# Patient Record
Sex: MALE | Race: WHITE | Employment: OTHER | URBAN - METROPOLITAN AREA
[De-identification: names, ages, dates, MRNs, and addresses within clinical notes are randomized per-mention and may not be internally consistent; named-entity substitution may affect disease eponyms.]

---

## 2018-07-13 ENCOUNTER — HOSPITAL ENCOUNTER (OUTPATIENT)
Dept: ULTRASOUND IMAGING | Age: 73
Discharge: HOME | End: 2018-07-13
Attending: PHYSICIAN ASSISTANT
Payer: MEDICARE

## 2018-07-13 DIAGNOSIS — R60.0 LOCALIZED EDEMA: ICD-10-CM

## 2018-07-13 PROCEDURE — 93970 EXTREMITY STUDY: CPT

## 2021-05-24 ENCOUNTER — APPOINTMENT (OUTPATIENT)
Dept: GENERAL RADIOLOGY | Age: 76
DRG: 432 | End: 2021-05-24
Attending: FAMILY MEDICINE
Payer: MEDICARE

## 2021-05-24 ENCOUNTER — HOSPITAL ENCOUNTER (INPATIENT)
Age: 76
LOS: 5 days | Discharge: LEFT BEFORE DISCHARGE | DRG: 432 | End: 2021-05-29
Attending: EMERGENCY MEDICINE | Admitting: FAMILY MEDICINE
Payer: MEDICARE

## 2021-05-24 ENCOUNTER — APPOINTMENT (OUTPATIENT)
Dept: VASCULAR SURGERY | Age: 76
DRG: 432 | End: 2021-05-24
Attending: FAMILY MEDICINE
Payer: MEDICARE

## 2021-05-24 ENCOUNTER — APPOINTMENT (OUTPATIENT)
Dept: CT IMAGING | Age: 76
DRG: 432 | End: 2021-05-24
Attending: EMERGENCY MEDICINE
Payer: MEDICARE

## 2021-05-24 DIAGNOSIS — R18.8 CIRRHOSIS OF LIVER WITH ASCITES, UNSPECIFIED HEPATIC CIRRHOSIS TYPE (HCC): Primary | ICD-10-CM

## 2021-05-24 DIAGNOSIS — B18.2 CHRONIC HEPATITIS C WITHOUT HEPATIC COMA (HCC): ICD-10-CM

## 2021-05-24 DIAGNOSIS — R79.89 LFT ELEVATION: ICD-10-CM

## 2021-05-24 DIAGNOSIS — K74.60 CIRRHOSIS OF LIVER WITH ASCITES, UNSPECIFIED HEPATIC CIRRHOSIS TYPE (HCC): Primary | ICD-10-CM

## 2021-05-24 DIAGNOSIS — K62.89 RECTAL MASS: ICD-10-CM

## 2021-05-24 DIAGNOSIS — N50.89 SCROTAL EDEMA: ICD-10-CM

## 2021-05-24 PROBLEM — D69.6 THROMBOCYTOPENIA (HCC): Status: ACTIVE | Noted: 2021-05-24

## 2021-05-24 PROBLEM — R60.0 EDEMA OF BOTH LOWER EXTREMITIES: Status: ACTIVE | Noted: 2021-05-24

## 2021-05-24 PROBLEM — E80.6 HYPERBILIRUBINEMIA: Status: ACTIVE | Noted: 2021-05-24

## 2021-05-24 PROBLEM — R53.1 GENERALIZED WEAKNESS: Status: ACTIVE | Noted: 2021-05-24

## 2021-05-24 PROBLEM — D64.9 ANEMIA: Status: ACTIVE | Noted: 2021-05-24

## 2021-05-24 LAB
ALBUMIN SERPL-MCNC: 2.4 G/DL (ref 3.5–5)
ALBUMIN/GLOB SERPL: 0.6 {RATIO} (ref 1.1–2.2)
ALP SERPL-CCNC: 145 U/L (ref 45–117)
ALT SERPL-CCNC: 49 U/L (ref 12–78)
ANION GAP SERPL CALC-SCNC: 4 MMOL/L (ref 5–15)
APPEARANCE UR: CLEAR
AST SERPL-CCNC: 80 U/L (ref 15–37)
ATRIAL RATE: 66 BPM
BASOPHILS # BLD: 0.1 K/UL (ref 0–0.1)
BASOPHILS NFR BLD: 1 % (ref 0–1)
BILIRUB SERPL-MCNC: 1.3 MG/DL (ref 0.2–1)
BILIRUB UR QL: NEGATIVE
BNP SERPL-MCNC: 744 PG/ML
BUN SERPL-MCNC: 7 MG/DL (ref 6–20)
BUN/CREAT SERPL: 10 (ref 12–20)
CALCIUM SERPL-MCNC: 8.2 MG/DL (ref 8.5–10.1)
CALCULATED P AXIS, ECG09: 80 DEGREES
CALCULATED R AXIS, ECG10: 38 DEGREES
CALCULATED T AXIS, ECG11: 53 DEGREES
CHLORIDE SERPL-SCNC: 110 MMOL/L (ref 97–108)
CO2 SERPL-SCNC: 26 MMOL/L (ref 21–32)
COLOR UR: NORMAL
COMMENT, HOLDF: NORMAL
CREAT SERPL-MCNC: 0.73 MG/DL (ref 0.7–1.3)
DIAGNOSIS, 93000: NORMAL
DIFFERENTIAL METHOD BLD: ABNORMAL
EOSINOPHIL # BLD: 0.3 K/UL (ref 0–0.4)
EOSINOPHIL NFR BLD: 5 % (ref 0–7)
ERYTHROCYTE [DISTWIDTH] IN BLOOD BY AUTOMATED COUNT: 12.6 % (ref 11.5–14.5)
GLOBULIN SER CALC-MCNC: 3.8 G/DL (ref 2–4)
GLUCOSE SERPL-MCNC: 99 MG/DL (ref 65–100)
GLUCOSE UR STRIP.AUTO-MCNC: NEGATIVE MG/DL
HAV IGM SER QL: NONREACTIVE
HBV CORE IGM SER QL: NONREACTIVE
HBV SURFACE AG SER QL: <0.1 INDEX
HBV SURFACE AG SER QL: NEGATIVE
HCT VFR BLD AUTO: 34.5 % (ref 36.6–50.3)
HCV AB SER IA-ACNC: >11 INDEX
HCV AB SERPL QL IA: REACTIVE
HCV COMMENT,HCGAC: ABNORMAL
HGB BLD-MCNC: 11.6 G/DL (ref 12.1–17)
HGB UR QL STRIP: NEGATIVE
IMM GRANULOCYTES # BLD AUTO: 0 K/UL (ref 0–0.04)
IMM GRANULOCYTES NFR BLD AUTO: 0 % (ref 0–0.5)
KETONES UR QL STRIP.AUTO: NEGATIVE MG/DL
LEUKOCYTE ESTERASE UR QL STRIP.AUTO: NEGATIVE
LIPASE SERPL-CCNC: 168 U/L (ref 73–393)
LYMPHOCYTES # BLD: 1.2 K/UL (ref 0.8–3.5)
LYMPHOCYTES NFR BLD: 24 % (ref 12–49)
MCH RBC QN AUTO: 34 PG (ref 26–34)
MCHC RBC AUTO-ENTMCNC: 33.6 G/DL (ref 30–36.5)
MCV RBC AUTO: 101.2 FL (ref 80–99)
MONOCYTES # BLD: 0.6 K/UL (ref 0–1)
MONOCYTES NFR BLD: 12 % (ref 5–13)
NEUTS SEG # BLD: 2.7 K/UL (ref 1.8–8)
NEUTS SEG NFR BLD: 58 % (ref 32–75)
NITRITE UR QL STRIP.AUTO: NEGATIVE
NRBC # BLD: 0 K/UL (ref 0–0.01)
NRBC BLD-RTO: 0 PER 100 WBC
P-R INTERVAL, ECG05: 158 MS
PH UR STRIP: 7.5 [PH] (ref 5–8)
PLATELET # BLD AUTO: 130 K/UL (ref 150–400)
PMV BLD AUTO: 10.2 FL (ref 8.9–12.9)
POTASSIUM SERPL-SCNC: 3.5 MMOL/L (ref 3.5–5.1)
PROT SERPL-MCNC: 6.2 G/DL (ref 6.4–8.2)
PROT UR STRIP-MCNC: NEGATIVE MG/DL
Q-T INTERVAL, ECG07: 426 MS
QRS DURATION, ECG06: 82 MS
QTC CALCULATION (BEZET), ECG08: 446 MS
RBC # BLD AUTO: 3.41 M/UL (ref 4.1–5.7)
SAMPLES BEING HELD,HOLD: NORMAL
SODIUM SERPL-SCNC: 140 MMOL/L (ref 136–145)
SP GR UR REFRACTOMETRY: 1.01 (ref 1–1.03)
SP1: ABNORMAL
SP2: ABNORMAL
SP3: ABNORMAL
TROPONIN I SERPL-MCNC: <0.05 NG/ML
UROBILINOGEN UR QL STRIP.AUTO: 1 EU/DL (ref 0.2–1)
VENTRICULAR RATE, ECG03: 66 BPM
WBC # BLD AUTO: 4.8 K/UL (ref 4.1–11.1)

## 2021-05-24 PROCEDURE — 99285 EMERGENCY DEPT VISIT HI MDM: CPT

## 2021-05-24 PROCEDURE — 83690 ASSAY OF LIPASE: CPT

## 2021-05-24 PROCEDURE — 93970 EXTREMITY STUDY: CPT

## 2021-05-24 PROCEDURE — 65270000032 HC RM SEMIPRIVATE

## 2021-05-24 PROCEDURE — 83880 ASSAY OF NATRIURETIC PEPTIDE: CPT

## 2021-05-24 PROCEDURE — 84484 ASSAY OF TROPONIN QUANT: CPT

## 2021-05-24 PROCEDURE — 81003 URINALYSIS AUTO W/O SCOPE: CPT

## 2021-05-24 PROCEDURE — 74177 CT ABD & PELVIS W/CONTRAST: CPT

## 2021-05-24 PROCEDURE — 85025 COMPLETE CBC W/AUTO DIFF WBC: CPT

## 2021-05-24 PROCEDURE — 80074 ACUTE HEPATITIS PANEL: CPT

## 2021-05-24 PROCEDURE — 74011000636 HC RX REV CODE- 636: Performed by: RADIOLOGY

## 2021-05-24 PROCEDURE — 93005 ELECTROCARDIOGRAM TRACING: CPT

## 2021-05-24 PROCEDURE — 36415 COLL VENOUS BLD VENIPUNCTURE: CPT

## 2021-05-24 PROCEDURE — 80053 COMPREHEN METABOLIC PANEL: CPT

## 2021-05-24 PROCEDURE — 94762 N-INVAS EAR/PLS OXIMTRY CONT: CPT

## 2021-05-24 RX ORDER — POLYETHYLENE GLYCOL 3350 17 G/17G
17 POWDER, FOR SOLUTION ORAL DAILY PRN
Status: DISCONTINUED | OUTPATIENT
Start: 2021-05-24 | End: 2021-05-28

## 2021-05-24 RX ORDER — ONDANSETRON 2 MG/ML
4 INJECTION INTRAMUSCULAR; INTRAVENOUS
Status: DISCONTINUED | OUTPATIENT
Start: 2021-05-24 | End: 2021-05-29 | Stop reason: HOSPADM

## 2021-05-24 RX ORDER — SODIUM CHLORIDE 0.9 % (FLUSH) 0.9 %
5-40 SYRINGE (ML) INJECTION EVERY 8 HOURS
Status: DISCONTINUED | OUTPATIENT
Start: 2021-05-24 | End: 2021-05-29 | Stop reason: HOSPADM

## 2021-05-24 RX ORDER — SODIUM CHLORIDE 0.9 % (FLUSH) 0.9 %
5-40 SYRINGE (ML) INJECTION AS NEEDED
Status: DISCONTINUED | OUTPATIENT
Start: 2021-05-24 | End: 2021-05-29 | Stop reason: HOSPADM

## 2021-05-24 RX ADMIN — Medication 10 ML: at 21:45

## 2021-05-24 RX ADMIN — IOPAMIDOL 100 ML: 755 INJECTION, SOLUTION INTRAVENOUS at 16:01

## 2021-05-24 NOTE — ROUTINE PROCESS
TRANSFER - OUT REPORT: 
 
Verbal report given to RN Karma(name) on Mindy Maravilla  being transferred to (unit) for routine progression of care Report consisted of patients Situation, Background, Assessment and  
Recommendations(SBAR). Information from the following report(s) SBAR, Kardex and ED Summary was reviewed with the receiving nurse. Lines:  
Peripheral IV 05/24/21 Left Antecubital (Active) Site Assessment Clean, dry, & intact 05/24/21 1258 Phlebitis Assessment 0 05/24/21 1258 Infiltration Assessment 0 05/24/21 1258 Dressing Status Clean, dry, & intact 05/24/21 1258 Dressing Type Transparent 05/24/21 1258 Hub Color/Line Status Patent; Flushed;Capped;Pink 05/24/21 1258 Action Taken Blood drawn 05/24/21 1258 Opportunity for questions and clarification was provided. Patient transported with: 
 [employer]

## 2021-05-24 NOTE — PROGRESS NOTES
TRANSFER - IN REPORT:    Verbal report received from [address] RN(name) on Nancy Mayer  being received from ED(unit) for routine progression of care      Report consisted of patients Situation, Background, Assessment and   Recommendations(SBAR). Information from the following report(s) SBAR, ED Summary, MAR, Recent Results and Med Rec Status was reviewed with the receiving nurse. Opportunity for questions and clarification was provided. Assessment completed upon patients arrival to unit and care assumed.

## 2021-05-24 NOTE — ED TRIAGE NOTES
Pt c/o bilateral leg swelling x years, getting worse, pt also having right and left sided abd pain, denies cp, some sob, denies cough, denies fever or chills , denies n/v

## 2021-05-24 NOTE — H&P
History & Physical    Date of admission: 5/24/2021    Patient name: Huyen Echeverria  MRN: [id number]  YOB: 1945  Age: 76 y.o. Primary care provider:  None     Source of Information: patient, patient's friend/ power of , ED and electronic medical records                                  Chief complaint:  Leg swelling and abdominal pain    History of present illness  Huyen Echeverria is a 76 y.o. male with past medical history of alcohol abuse and remote IV drug abuse presented to the ED from home with chief complaints of leg swelling and abdominal pain. Patient is a limited historian, very anxious and intermittently argumentative during exam.  His friend and power of  who was at the bedside provided some history. Remainder of history was obtained per review of ED and electronic medical records. Per these reports, patient has chronic bilateral leg swelling (since \"2016\") but recently he has generalized swelling not only involving legs but also his scrotum with abdominal distention and pain. Patient would not rate or further describe his abdominal pain. On arrival in the ED, per ED exam patient had 4+ pitting edema of leg and scrotal edema. Workup included CT abdomen/ pelvis showing a 3.6 cm annular mass in the mid rectum, cirrhosis with portal hypertension, extensive varices including large, and distended gallbladder with no stones or ductal dilatation. Acute hepatitis panel showed Hep C antibody reactive. Patient notes that he thinks that he may have had Hep C in the past with prior remote history of IV drug abuse. Patient is now seen for admission to the hospitalist service.   There were no reports of new onset syncope, loss of consciousness, headache, neck pain, visual disturbance, numbness, paresthesias, focal weakness, chest pain, palpitations, SOB, nausea, vomiting, diarrhea, calf pain, increased leg swelling/ edema, fever, chills, rash, or known COVID 19 infection. PAST MEDICAL HISTORY:  Chronic leg edema  Alcohol abuse    MEDICATIONS:  NONE    ALLERGIES:  NO KNOWN DRUG ALLERGIES    FAMILY HISTORY:  Unknown regarding family members with chronic medical conditions     Social history  Patient resides  x  Independently                Ambulates      x  With cane              Alcohol history   x  former history of alcohol abuse           Smoking history          x  Current smoker         Illicit drugs:  Former h/o IV drug abuse in 1980's    Code status      x  DNR/DNI        Code status was discussed with the patient and his power of . Review of systems  Pertinent positives as noted in HPI. All other systems were reviewed and were negative. Physical Examination   Visit Vitals  BP (!) 150/70   Pulse (!) 54   Temp 97.4 °F (36.3 °C)   Resp 20   SpO2 97%          O2 Device: None (Room air)    General:  Patient in no acute respiratory distress. Head:  Normocephalic, without obvious abnormality, atraumatic   Eyes:  Conjunctivae/corneas clear. PERRL, EOMs intact   E/N/M/T: Nares normal. Septum midline.  No nasal drainage or sinus tenderness  Lips, mucosa, and tongue normal   Teeth and gums normal  Clear oropharynx   Neck: Normal appearance and movements, symmetrical, trachea midline  No palpable adenopathy  No thyroid enlargement, tenderness or nodules  No carotid bruit   No JVD   Lungs:   Symmetrical chest expansion and respiratory effort  Clear to auscultation bilaterally   Chest wall:  No tenderness or deformity   Heart:  Regular rate and rhythm   Sounds normal; no murmur, click, rub or gallop   Abdomen:   Soft, no tenderness  Bowel sounds normal  No masses or hepatosplenomegaly  No hernias present   Back: No CVA tenderness   Extremities: Extremities normal, atraumatic  No cyanosis  4+ pitting edema BLE  No DVT signs   Pulses 2+ radial/ 1+ DP bilateral pulses   Skin: No rashes or ulcers   Musculo-      skeletal: Gait not tested     Neuro: GCS 15. Moves all extremities x 4. No slurred speech. No facial droop. Sensation grossly intact. Psych: Alert, oriented x3     Genitourinary:    Scrotal edema     Data Review      24 Hour Results:  Recent Results (from the past 24 hour(s))   EKG, 12 LEAD, INITIAL    Collection Time: 05/24/21 12:44 PM   Result Value Ref Range    Ventricular Rate 66 BPM    Atrial Rate 66 BPM    P-R Interval 158 ms    QRS Duration 82 ms    Q-T Interval 426 ms    QTC Calculation (Bezet) 446 ms    Calculated P Axis 80 degrees    Calculated R Axis 38 degrees    Calculated T Axis 53 degrees    Diagnosis       Normal sinus rhythm  No previous ECGs available  Confirmed by Abdirahman Gray M.D., Evins Solum ([id number]) on 5/24/2021 3:54:59 PM     CBC WITH AUTOMATED DIFF    Collection Time: 05/24/21 12:46 PM   Result Value Ref Range    WBC 4.8 4.1 - 11.1 K/uL    RBC 3.41 (L) 4.10 - 5.70 M/uL    HGB 11.6 (L) 12.1 - 17.0 g/dL    HCT 34.5 (L) 36.6 - 50.3 %    .2 (H) 80.0 - 99.0 FL    MCH 34.0 26.0 - 34.0 PG    MCHC 33.6 30.0 - 36.5 g/dL    RDW 12.6 11.5 - 14.5 %    PLATELET 666 (L) 823 - 400 K/uL    MPV 10.2 8.9 - 12.9 FL    NRBC 0.0 0  WBC    ABSOLUTE NRBC 0.00 0.00 - 0.01 K/uL    NEUTROPHILS 58 32 - 75 %    LYMPHOCYTES 24 12 - 49 %    MONOCYTES 12 5 - 13 %    EOSINOPHILS 5 0 - 7 %    BASOPHILS 1 0 - 1 %    IMMATURE GRANULOCYTES 0 0.0 - 0.5 %    ABS. NEUTROPHILS 2.7 1.8 - 8.0 K/UL    ABS. LYMPHOCYTES 1.2 0.8 - 3.5 K/UL    ABS. MONOCYTES 0.6 0.0 - 1.0 K/UL    ABS. EOSINOPHILS 0.3 0.0 - 0.4 K/UL    ABS. BASOPHILS 0.1 0.0 - 0.1 K/UL    ABS. IMM.  GRANS. 0.0 0.00 - 0.04 K/UL    DF AUTOMATED     METABOLIC PANEL, COMPREHENSIVE    Collection Time: 05/24/21 12:46 PM   Result Value Ref Range    Sodium 140 136 - 145 mmol/L    Potassium 3.5 3.5 - 5.1 mmol/L    Chloride 110 (H) 97 - 108 mmol/L    CO2 26 21 - 32 mmol/L    Anion gap 4 (L) 5 - 15 mmol/L    Glucose 99 65 - 100 mg/dL    BUN 7 6 - 20 MG/DL    Creatinine 0.73 0.70 - 1.30 MG/DL    BUN/Creatinine ratio 10 (L) 12 - 20      GFR est AA >60 >60 ml/min/1.73m2    GFR est non-AA >60 >60 ml/min/1.73m2    Calcium 8.2 (L) 8.5 - 10.1 MG/DL    Bilirubin, total 1.3 (H) 0.2 - 1.0 MG/DL    ALT (SGPT) 49 12 - 78 U/L    AST (SGOT) 80 (H) 15 - 37 U/L    Alk. phosphatase 145 (H) 45 - 117 U/L    Protein, total 6.2 (L) 6.4 - 8.2 g/dL    Albumin 2.4 (L) 3.5 - 5.0 g/dL    Globulin 3.8 2.0 - 4.0 g/dL    A-G Ratio 0.6 (L) 1.1 - 2.2     SAMPLES BEING HELD    Collection Time: 05/24/21 12:46 PM   Result Value Ref Range    SAMPLES BEING HELD 1blu,1red     COMMENT        Add-on orders for these samples will be processed based on acceptable specimen integrity and analyte stability, which may vary by analyte. NT-PRO BNP    Collection Time: 05/24/21 12:46 PM   Result Value Ref Range    NT pro- (H) <450 PG/ML   TROPONIN I    Collection Time: 05/24/21 12:46 PM   Result Value Ref Range    Troponin-I, Qt. <0.05 <0.05 ng/mL   LIPASE    Collection Time: 05/24/21 12:46 PM   Result Value Ref Range    Lipase 168 73 - 393 U/L   HEPATITIS PANEL, ACUTE    Collection Time: 05/24/21 12:46 PM   Result Value Ref Range    Hepatitis A, IgM NONREACTIVE NR      __          Hepatitis B surface Ag <0.10 Index    Hep B surface Ag Interp. Negative NEG      __          Hepatitis B core, IgM NONREACTIVE NR      __          Hepatitis C virus Ab >11.00 Index    Hep C virus Ab Interp.  REACTIVE (A) NR      Hep C  virus Ab comment Method used is Siemens Advia Centaur     URINALYSIS W/ RFLX MICROSCOPIC    Collection Time: 05/24/21  6:09 PM   Result Value Ref Range    Color YELLOW/STRAW      Appearance CLEAR CLEAR      Specific gravity 1.010 1.003 - 1.030      pH (UA) 7.5 5.0 - 8.0      Protein Negative NEG mg/dL    Glucose Negative NEG mg/dL    Ketone Negative NEG mg/dL    Bilirubin Negative NEG      Blood Negative NEG      Urobilinogen 1.0 0.2 - 1.0 EU/dL    Nitrites Negative NEG      Leukocyte Esterase Negative NEG       Recent Labs     05/24/21  1246   WBC 4.8   HGB 11.6*   HCT 34.5*   *     Recent Labs     05/24/21  1246      K 3.5   *   CO2 26   GLU 99   BUN 7   CREA 0.73   CA 8.2*   ALB 2.4*   TBILI 1.3*   ALT 49       Imaging  CT ABD PELV W CONT     INDICATION: abdominal swelling     COMPARISON: None      CONTRAST: 100 mL of Isovue-370.     TECHNIQUE:   Following the uneventful intravenous administration of contrast, thin axial  images were obtained through the abdomen and pelvis. Coronal and sagittal  reconstructions were generated. Oral contrast was not administered. CT dose  reduction was achieved through use of a standardized protocol tailored for this  examination and automatic exposure control for dose modulation.     FINDINGS:   LOWER THORAX: No significant abnormality in the incidentally imaged lower chest.  LIVER: Small and nodular. No enhancing mass lesion  BILIARY TREE: Distended but no gallstones or surrounding inflammation CBD is not  dilated. SPLEEN: Measures 13.7 cm  PANCREAS: No mass or ductal dilatation. ADRENALS: Unremarkable. KIDNEYS: Probable renal cysts. No hydronephrosis  STOMACH: Unremarkable. SMALL BOWEL: No dilatation or wall thickening. COLON: Scattered diverticulosis. Mild thickening of the colonic wall may be  related to the patient's cirrhosis. . 3.6 cm annular mass in the mid rectum. There is enhancement of the fat posterior lateral to the mass concerning for  transmural spread. APPENDIX: Normal  PERITONEUM: No ascites or pneumoperitoneum. RETROPERITONEUM: No lymphadenopathy or aortic aneurysm. Extensive vascular  calcifications. There are extensive varices in the upper abdomen with large  gastric varices  REPRODUCTIVE ORGANS: Not enlarged  URINARY BLADDER: Small stones within the bladder. BONES: No destructive bone lesion. ABDOMINAL WALL: No mass or hernia. ADDITIONAL COMMENTS: N/A     IMPRESSION     1. 3.6 cm annular mass in the mid rectum. There appears to be transmural spread  posterior laterally on the left  2. Cirrhosis with portal hypertension and extensive varices including large  gastric varices  3. Gallbladder is distended but no stones or ductal dilatation. Assessment and Plan   1. Cirrhosis of liver with ascites       - with portal hypertension and gastric varices       - admit to telemetry       - consult hepatologist        - repeat LFTs        2. Rectal mass       - consult colorectal surgeon in a.m. 3.  Hyperbilirubinemia       - repeat total and direct bilirubin level in a.m.    4.  Anemia        - order repeat H/H       - check iron profile, B12, and folate levels    5. Thrombocytopenia         - repeat platelet count    6. Generalized weakness        - required assistance to stand; place on fall precautions       - consider for PT evaluation    7. Scrotal edema        - may use scrotal elevator    8. LFT elevation       - repeat CMP    9. Edema of both lower extremities        - former used diuretics but patient stopped due to \"diarrhea\"       - keep legs elevated at rest       - monitor fluid status with strict Is and Os / daily weights       - ordered venous duplex BLE to rule out DVT    10.  Generalized abdominal pain         - no acute GI tenderness elicited on exam         - continue plan as above    11. Tobacco abuse         - encourage smoking cessation         - Nicotine patch    VTE prophylaxis:  SCDs to [address] [place] [address] Avenue:  DO NOT RESUSCITATE (DNR) as discussed with both patient and his power of . Patient signed durable DNR form.        Signed by: Zandra Delgado MD    May 24, 2021 at 7:47 PM

## 2021-05-25 ENCOUNTER — APPOINTMENT (OUTPATIENT)
Dept: NON INVASIVE DIAGNOSTICS | Age: 76
DRG: 432 | End: 2021-05-25
Attending: FAMILY MEDICINE
Payer: MEDICARE

## 2021-05-25 ENCOUNTER — APPOINTMENT (OUTPATIENT)
Dept: GENERAL RADIOLOGY | Age: 76
DRG: 432 | End: 2021-05-25
Attending: INTERNAL MEDICINE
Payer: MEDICARE

## 2021-05-25 ENCOUNTER — APPOINTMENT (OUTPATIENT)
Dept: GENERAL RADIOLOGY | Age: 76
DRG: 432 | End: 2021-05-25
Attending: FAMILY MEDICINE
Payer: MEDICARE

## 2021-05-25 LAB
ALBUMIN SERPL-MCNC: 2 G/DL (ref 3.5–5)
ALBUMIN/GLOB SERPL: 0.6 {RATIO} (ref 1.1–2.2)
ALP SERPL-CCNC: 104 U/L (ref 45–117)
ALT SERPL-CCNC: 42 U/L (ref 12–78)
AMMONIA PLAS-SCNC: 82 UMOL/L
ANION GAP SERPL CALC-SCNC: 6 MMOL/L (ref 5–15)
APTT PPP: 32.5 SEC (ref 22.1–31)
AST SERPL-CCNC: 71 U/L (ref 15–37)
BASOPHILS # BLD: 0.1 K/UL (ref 0–0.1)
BASOPHILS NFR BLD: 1 % (ref 0–1)
BILIRUB SERPL-MCNC: 1.4 MG/DL (ref 0.2–1)
BUN SERPL-MCNC: 7 MG/DL (ref 6–20)
BUN/CREAT SERPL: 12 (ref 12–20)
CALCIUM SERPL-MCNC: 8 MG/DL (ref 8.5–10.1)
CHLORIDE SERPL-SCNC: 110 MMOL/L (ref 97–108)
CO2 SERPL-SCNC: 22 MMOL/L (ref 21–32)
CREAT SERPL-MCNC: 0.6 MG/DL (ref 0.7–1.3)
DIFFERENTIAL METHOD BLD: ABNORMAL
ECHO AO ROOT DIAM: 3.25 CM
ECHO AV AREA PEAK VELOCITY: 2.91 CM2
ECHO AV AREA/BSA PEAK VELOCITY: 1.4 CM2/M2
ECHO AV PEAK GRADIENT: 8.19 MMHG
ECHO AV PEAK VELOCITY: 143.06 CM/S
ECHO IVC PROX: 2.44 CM
ECHO LA MAJOR AXIS: 4.11 CM
ECHO LA MINOR AXIS: 2.02 CM
ECHO LV E' LATERAL VELOCITY: 10.46 CM/S
ECHO LV E' SEPTAL VELOCITY: 9.87 CM/S
ECHO LV EDV A2C: 103.74 ML
ECHO LV EDV A4C: 80.01 ML
ECHO LV EDV BP: 93.16 ML (ref 67–155)
ECHO LV EDV INDEX A4C: 39.4 ML/M2
ECHO LV EDV INDEX BP: 45.9 ML/M2
ECHO LV EDV NDEX A2C: 51.1 ML/M2
ECHO LV EJECTION FRACTION A2C: 77 PERCENT
ECHO LV EJECTION FRACTION A4C: 70 PERCENT
ECHO LV EJECTION FRACTION BIPLANE: 74.1 PERCENT (ref 55–100)
ECHO LV ESV A2C: 23.52 ML
ECHO LV ESV A4C: 24.02 ML
ECHO LV ESV BP: 24.13 ML (ref 22–58)
ECHO LV ESV INDEX A2C: 11.6 ML/M2
ECHO LV ESV INDEX A4C: 11.8 ML/M2
ECHO LV ESV INDEX BP: 11.9 ML/M2
ECHO LV INTERNAL DIMENSION DIASTOLIC: 4.79 CM (ref 4.2–5.9)
ECHO LV INTERNAL DIMENSION SYSTOLIC: 3.1 CM
ECHO LV IVSD: 0.91 CM (ref 0.6–1)
ECHO LV MASS 2D: 148.4 G (ref 88–224)
ECHO LV MASS INDEX 2D: 73.1 G/M2 (ref 49–115)
ECHO LV POSTERIOR WALL DIASTOLIC: 0.9 CM (ref 0.6–1)
ECHO LVOT DIAM: 2.32 CM
ECHO LVOT PEAK GRADIENT: 3.88 MMHG
ECHO LVOT PEAK VELOCITY: 98.48 CM/S
ECHO MV A VELOCITY: 57.86 CM/S
ECHO MV AREA PHT: 2.2 CM2
ECHO MV E DECELERATION TIME (DT): 344.91 MS
ECHO MV E VELOCITY: 82.48 CM/S
ECHO MV E/A RATIO: 1.43
ECHO MV E/E' LATERAL: 7.89
ECHO MV E/E' RATIO (AVERAGED): 8.12
ECHO MV E/E' SEPTAL: 8.36
ECHO MV PRESSURE HALF TIME (PHT): 100.02 MS
ECHO PV PEAK INSTANTANEOUS GRADIENT SYSTOLIC: 2.2 MMHG
ECHO PV REGURGITANT MAX VELOCITY: 74.23 CM/S
ECHO RV INTERNAL DIMENSION: 4.01 CM
ECHO RV TAPSE: 3.52 CM (ref 1.5–2)
ECHO TV REGURGITANT MAX VELOCITY: 289.08 CM/S
ECHO TV REGURGITANT PEAK GRADIENT: 33.43 MMHG
EOSINOPHIL # BLD: 0.4 K/UL (ref 0–0.4)
EOSINOPHIL NFR BLD: 7 % (ref 0–7)
ERYTHROCYTE [DISTWIDTH] IN BLOOD BY AUTOMATED COUNT: 12.2 % (ref 11.5–14.5)
FERRITIN SERPL-MCNC: 656 NG/ML (ref 26–388)
FOLATE SERPL-MCNC: 7.6 NG/ML (ref 5–21)
GLOBULIN SER CALC-MCNC: 3.4 G/DL (ref 2–4)
GLUCOSE SERPL-MCNC: 98 MG/DL (ref 65–100)
HCT VFR BLD AUTO: 31.5 % (ref 36.6–50.3)
HEMOCCULT STL QL: NEGATIVE
HGB BLD-MCNC: 10.6 G/DL (ref 12.1–17)
IMM GRANULOCYTES # BLD AUTO: 0 K/UL (ref 0–0.04)
IMM GRANULOCYTES NFR BLD AUTO: 0 % (ref 0–0.5)
INR PPP: 1.3 (ref 0.9–1.1)
IRON SATN MFR SERPL: 80 % (ref 20–50)
IRON SERPL-MCNC: 191 UG/DL (ref 35–150)
LACTATE SERPL-SCNC: 1.1 MMOL/L (ref 0.4–2)
LYMPHOCYTES # BLD: 1.3 K/UL (ref 0.8–3.5)
LYMPHOCYTES NFR BLD: 24 % (ref 12–49)
MAGNESIUM SERPL-MCNC: 1.9 MG/DL (ref 1.6–2.4)
MCH RBC QN AUTO: 33.8 PG (ref 26–34)
MCHC RBC AUTO-ENTMCNC: 33.7 G/DL (ref 30–36.5)
MCV RBC AUTO: 100.3 FL (ref 80–99)
MONOCYTES # BLD: 0.6 K/UL (ref 0–1)
MONOCYTES NFR BLD: 12 % (ref 5–13)
NEUTS SEG # BLD: 2.9 K/UL (ref 1.8–8)
NEUTS SEG NFR BLD: 56 % (ref 32–75)
NRBC # BLD: 0 K/UL (ref 0–0.01)
NRBC BLD-RTO: 0 PER 100 WBC
PLATELET # BLD AUTO: 117 K/UL (ref 150–400)
PMV BLD AUTO: 10.2 FL (ref 8.9–12.9)
POTASSIUM SERPL-SCNC: 3.3 MMOL/L (ref 3.5–5.1)
PROT SERPL-MCNC: 5.4 G/DL (ref 6.4–8.2)
PROTHROMBIN TIME: 13.3 SEC (ref 9–11.1)
RBC # BLD AUTO: 3.14 M/UL (ref 4.1–5.7)
SARS-COV-2, COV2: NORMAL
SODIUM SERPL-SCNC: 138 MMOL/L (ref 136–145)
THERAPEUTIC RANGE,PTTT: ABNORMAL SECS (ref 58–77)
TIBC SERPL-MCNC: 240 UG/DL (ref 250–450)
VIT B12 SERPL-MCNC: 467 PG/ML (ref 193–986)
WBC # BLD AUTO: 5.2 K/UL (ref 4.1–11.1)

## 2021-05-25 PROCEDURE — 93306 TTE W/DOPPLER COMPLETE: CPT

## 2021-05-25 PROCEDURE — 82728 ASSAY OF FERRITIN: CPT

## 2021-05-25 PROCEDURE — 85730 THROMBOPLASTIN TIME PARTIAL: CPT

## 2021-05-25 PROCEDURE — 80053 COMPREHEN METABOLIC PANEL: CPT

## 2021-05-25 PROCEDURE — 83605 ASSAY OF LACTIC ACID: CPT

## 2021-05-25 PROCEDURE — 82272 OCCULT BLD FECES 1-3 TESTS: CPT

## 2021-05-25 PROCEDURE — 36415 COLL VENOUS BLD VENIPUNCTURE: CPT

## 2021-05-25 PROCEDURE — 82140 ASSAY OF AMMONIA: CPT

## 2021-05-25 PROCEDURE — 82607 VITAMIN B-12: CPT

## 2021-05-25 PROCEDURE — 65270000032 HC RM SEMIPRIVATE

## 2021-05-25 PROCEDURE — U0005 INFEC AGEN DETEC AMPLI PROBE: HCPCS

## 2021-05-25 PROCEDURE — 85025 COMPLETE CBC W/AUTO DIFF WBC: CPT

## 2021-05-25 PROCEDURE — 74011250637 HC RX REV CODE- 250/637: Performed by: INTERNAL MEDICINE

## 2021-05-25 PROCEDURE — 74011250636 HC RX REV CODE- 250/636: Performed by: INTERNAL MEDICINE

## 2021-05-25 PROCEDURE — 83735 ASSAY OF MAGNESIUM: CPT

## 2021-05-25 PROCEDURE — 71045 X-RAY EXAM CHEST 1 VIEW: CPT

## 2021-05-25 PROCEDURE — 82746 ASSAY OF FOLIC ACID SERUM: CPT

## 2021-05-25 PROCEDURE — 74011250637 HC RX REV CODE- 250/637: Performed by: FAMILY MEDICINE

## 2021-05-25 PROCEDURE — 83540 ASSAY OF IRON: CPT

## 2021-05-25 PROCEDURE — 85610 PROTHROMBIN TIME: CPT

## 2021-05-25 RX ORDER — IBUPROFEN 200 MG
1 TABLET ORAL DAILY
Status: DISCONTINUED | OUTPATIENT
Start: 2021-05-25 | End: 2021-05-29 | Stop reason: HOSPADM

## 2021-05-25 RX ORDER — HYDRALAZINE HYDROCHLORIDE 20 MG/ML
10 INJECTION INTRAMUSCULAR; INTRAVENOUS
Status: DISCONTINUED | OUTPATIENT
Start: 2021-05-25 | End: 2021-05-29 | Stop reason: HOSPADM

## 2021-05-25 RX ORDER — POTASSIUM CHLORIDE 750 MG/1
40 TABLET, FILM COATED, EXTENDED RELEASE ORAL
Status: ACTIVE | OUTPATIENT
Start: 2021-05-25 | End: 2021-05-25

## 2021-05-25 RX ORDER — LORAZEPAM 1 MG/1
2 TABLET ORAL
Status: DISCONTINUED | OUTPATIENT
Start: 2021-05-25 | End: 2021-05-29 | Stop reason: HOSPADM

## 2021-05-25 RX ORDER — LORAZEPAM 1 MG/1
1 TABLET ORAL
Status: DISCONTINUED | OUTPATIENT
Start: 2021-05-25 | End: 2021-05-29 | Stop reason: HOSPADM

## 2021-05-25 RX ORDER — FUROSEMIDE 10 MG/ML
40 INJECTION INTRAMUSCULAR; INTRAVENOUS 2 TIMES DAILY
Status: DISCONTINUED | OUTPATIENT
Start: 2021-05-25 | End: 2021-05-29 | Stop reason: HOSPADM

## 2021-05-25 RX ORDER — LANOLIN ALCOHOL/MO/W.PET/CERES
100 CREAM (GRAM) TOPICAL DAILY
Status: DISCONTINUED | OUTPATIENT
Start: 2021-05-25 | End: 2021-05-29 | Stop reason: HOSPADM

## 2021-05-25 RX ORDER — FOLIC ACID 1 MG/1
1 TABLET ORAL DAILY
Status: DISCONTINUED | OUTPATIENT
Start: 2021-05-25 | End: 2021-05-29 | Stop reason: HOSPADM

## 2021-05-25 RX ORDER — TRAMADOL HYDROCHLORIDE 50 MG/1
50 TABLET ORAL
Status: DISCONTINUED | OUTPATIENT
Start: 2021-05-25 | End: 2021-05-29 | Stop reason: HOSPADM

## 2021-05-25 RX ADMIN — Medication 10 ML: at 08:06

## 2021-05-25 RX ADMIN — Medication 1 TABLET: at 10:01

## 2021-05-25 RX ADMIN — Medication 10 ML: at 08:04

## 2021-05-25 RX ADMIN — FUROSEMIDE 40 MG: 10 INJECTION, SOLUTION INTRAMUSCULAR; INTRAVENOUS at 17:19

## 2021-05-25 RX ADMIN — Medication 10 ML: at 21:19

## 2021-05-25 RX ADMIN — Medication 100 MG: at 10:01

## 2021-05-25 RX ADMIN — FOLIC ACID 1 MG: 1 TABLET ORAL at 10:01

## 2021-05-25 RX ADMIN — Medication 10 ML: at 14:37

## 2021-05-25 RX ADMIN — FUROSEMIDE 40 MG: 10 INJECTION, SOLUTION INTRAMUSCULAR; INTRAVENOUS at 10:01

## 2021-05-25 NOTE — PROGRESS NOTES
Physical Therapy  Orders received and chart reviewed. Per nursing, patient is up and moving in room independently. Met with patient who states \"I don't need no Physical Therapy. \"  Patient is up and moving in room without assist.  Will complete orders at this time. Please re-consult if a skilled need arises.   Lenin Mayen, PT

## 2021-05-25 NOTE — PROGRESS NOTES
Patient refused his potassium tablets this morning and stated he was allergic to potassium. Patient stated \"it makes nauseated and upsets my stomach. I do not want to take this medication\". Md was made aware.

## 2021-05-25 NOTE — PROGRESS NOTES
[address] [facility]  [facility]ospitalist Group                                                                                          Hospitalist Progress Note  Gagan Whitaker MD  Answering service: [phone] OR [phone] from in house phone        Date of Service:  2021  NAME:  Jolene Lorenzo  :  1945  MRN:  [id number]      Admission Summary:   75 yo male admitted for leg pain and abd pain, found to have cirrhosis of the liver with ascites and rectal mass. Work up to include echo, duplex US pending. Hepatologist and CRS consulted. Interval history / Subjective:   Pt awake, NAD. He reports BM with evidence of blood mixed this AM     Assessment & Plan:     Cirrhosis of liver with ascites in the setting of alcohol abuse and remote IV drug use. Generalized abdominal pain  Hyperbilirubinemia/transaminitis  Thrombocytopenia  Cirrhosis with portal hypertension and extensive varices including large  GI bleed? gastric varices seen on CT a/p  hepc C ab +  Add IV lasix  Monitor LFT  Add CIWA, MVI, folic acid, thiamine  Consult hepatologist            Rectal mass  CT a/p showed 3.6 cm annular mass in the mid rectum. Consult colorectal surgeon     Anemia   Iron profile suggestive of chronic disease. Check ferritin. B12 wnl, and folate levels  Daily labs, transfuse for hgb<7  FOBT pending     Generalized weakness   Required assistance to stand; place on fall precautions  PT/OT     Scrotal edema   B/l LE edema  Former used diuretics but patient stopped due to \"diarrhea\"  CXR pending, probnp 744  Start IV lasix BID  Keep legs elevated at rest, daily wt, strict I&O  F/uwith Echo and duplex US to r/o DVT  Monitor fluid status with strict Is and Os / daily weights         Tobacco abuse  Encourage smoking cessation  Nicotine patch     VTE prophylaxis:  SCDs to BLEs     ADVANCED DIRECTIVE/ CODE STATUS:  DO NOT RESUSCITATE (DNR) as discussed with both patient and his power of .   Patient signed durable DNR form. Hospital Problems  Never Reviewed        Codes Class Noted POA    Hyperbilirubinemia ICD-10-CM: E80.6  ICD-9-CM: 782.4  5/24/2021 Unknown        Anemia ICD-10-CM: D64.9  ICD-9-CM: 285.9  5/24/2021 Unknown        Thrombocytopenia ([facility] ICD-10-CM: D69.6  ICD-9-CM: 287.5  5/24/2021 Unknown        Rectal mass ICD-10-CM: K62.89  ICD-9-CM: 787.99  5/24/2021 Unknown        Generalized weakness ICD-10-CM: R53.1  ICD-9-CM: 780.79  5/24/2021 Unknown        Scrotal edema ICD-10-CM: N50.89  ICD-9-CM: 608.86  5/24/2021 Unknown        LFT elevation ICD-10-CM: R79.89  ICD-9-CM: 790.6  5/24/2021 Unknown        Cirrhosis of liver with ascites ([facility] ICD-10-CM: K74.60, R18.8  ICD-9-CM: 571.5  5/24/2021 Unknown        Edema of both lower extremities ICD-10-CM: R60.0  ICD-9-CM: 782.3  5/24/2021 Unknown                Review of Systems:   A comprehensive review of systems was negative except for that written in the HPI. Vital Signs:    Last 24hrs VS reviewed since prior progress note. Most recent are:  Visit Vitals  BP (!) 162/76 (BP 1 Location: Left upper arm, BP Patient Position: At rest)   Pulse 62   Temp 97.2 °F (36.2 °C)   Resp 18   Wt 81 kg (178 lb 9.6 oz)   SpO2 96%       No intake or output data in the 24 hours ending 05/25/21 0935     Physical Examination:     I had a face to face encounter with this patient and independently examined them on 5/25/2021 as outlined below:          Constitutional:  No acute distress, cooperative, pleasant    ENT:  Oral mucosa moist, oropharynx benign. Resp:  CTA bilaterally. No wheezing/rhonchi/rales. No accessory muscle use   CV:  Regular rhythm, normal rate, no murmurs, gallops, rubs    GI:  Soft, non distended, non tender. normoactive bowel sounds, no hepatosplenomegaly     Musculoskeletal:  b/l LE edema up to thighs, + scrotal edema, warm, 2+ pulses throughout    Neurologic:  Moves all extremities.   AAOx3, CN II-XII reviewed            Data Review:    Review and/or order of clinical lab test      Labs:     Recent Labs     05/25/21  0330 05/24/21  1246   WBC 5.2 4.8   HGB 10.6* 11.6*   HCT 31.5* 34.5*   * 130*     Recent Labs     05/25/21  0330 05/24/21  1246    140   K 3.3* 3.5   * 110*   CO2 22 26   BUN 7 7   CREA 0.60* 0.73   GLU 98 99   CA 8.0* 8.2*   MG 1.9  --      Recent Labs     05/25/21  0330 05/24/21  1246   ALT 42 49    145*   TBILI 1.4* 1.3*   TP 5.4* 6.2*   ALB 2.0* 2.4*   GLOB 3.4 3.8   LPSE  --  168     Recent Labs     05/25/21  0330   INR 1.3*   PTP 13.3*   APTT 32.5*      Recent Labs     05/25/21  0330   TIBC 240*   PSAT 80*      Lab Results   Component Value Date/Time    Folate 7.6 05/25/2021 03:30 AM      No results for input(s): PH, PCO2, PO2 in the last 72 hours.   Recent Labs     05/24/21  1246   TROIQ <0.05     No results found for: CHOL, CHOLX, CHLST, CHOLV, HDL, HDLP, LDL, LDLC, DLDLP, TGLX, TRIGL, TRIGP, CHHD, CHHDX  No results found for: [facility]  Lab Results   Component Value Date/Time    Color YELLOW/STRAW 05/24/2021 06:09 PM    Appearance CLEAR 05/24/2021 06:09 PM    Specific gravity 1.010 05/24/2021 06:09 PM    pH (UA) 7.5 05/24/2021 06:09 PM    Protein Negative 05/24/2021 06:09 PM    Glucose Negative 05/24/2021 06:09 PM    Ketone Negative 05/24/2021 06:09 PM    Bilirubin Negative 05/24/2021 06:09 PM    Urobilinogen 1.0 05/24/2021 06:09 PM    Nitrites Negative 05/24/2021 06:09 PM    Leukocyte Esterase Negative 05/24/2021 06:09 PM         Medications Reviewed:     Current Facility-Administered Medications   Medication Dose Route Frequency    nicotine (NICODERM CQ) 21 mg/24 hr patch 1 Patch  1 Patch TransDERmal DAILY    potassium chloride SR (KLOR-CON 10) tablet 40 mEq  40 mEq Oral NOW    sodium chloride (NS) flush 5-40 mL  5-40 mL IntraVENous Q8H    sodium chloride (NS) flush 5-40 mL  5-40 mL IntraVENous PRN    polyethylene glycol (MIRALAX) packet 17 g  17 g Oral DAILY PRN    ondansetron (ZOFRAN) injection 4 mg  4 mg IntraVENous Q6H PRN     ______________________________________________________________________  EXPECTED LENGTH OF STAY: 3-4 days  ACTUAL LENGTH OF STAY:          1                 Christian Velasco MD

## 2021-05-25 NOTE — CONSULTS
Colorectal Surgery Consultation Note          NAME:  Demond Karimi   :   1945   MRN:   [id number]     Referring Physician: Tanner Quintana Md    Consultation Date: 2021    Chief Complaint:  Rectal mass     History of Present Illness: The patient is a 66-year-old male with chronic bilateral lower extremity edema was admitted after presenting for evaluation of worsening of that condition, genital swelling, and lower abdominal pain. He reports that at one point his testicles had become as large a softballs. His weight has increased 10 to 15 lb. over hisi baseline, and he is confident that that is secondary to the fluid. A CT scan performed in the ER revealed evidence of cirrhosis and portal hypertension as well as a non-obstructing rectal mass without any identifiable hepatic masses. He has recently been having diarrhea, and today he has seen some small amounts of blood with his bowel movements. He has a long history of alcohol abuse, but he has not had any alcohol since 2020. He has been told that he has hepatitis C, but he has never been officially diagnosed with cirrhosis. He does not have a primary physician, and he has never had a colonoscopy. He has never wanted to have one because of the bowel preparation. He states that he wants to die and that he has felt this way for a long time. His girlfriend of 21 years, his only true love,  suddenly 6 years ago. He has no living relatives. He was an only child. He was  from his first wife, and she eventually . His second wife also . His only child, a son with his first wife,  as a young adult. He does not have any friends, and he doesn't do anything. PMH:  Past Medical History:   Diagnosis Date    Alcohol abuse     No alcohol since 2020.  COVID-19 vaccine series started     Pfizer dose #1 received last week. Dose # 2 scheduled for 2021.        PSH:  Past Surgical History:   Procedure Laterality Date    HX BACK SURGERY  02/1978    Lumbar    HX OTHER SURGICAL  1990s    Excision of benign left neck mass.  HX TONSILLECTOMY  circa age 10    HX TONSILLECTOMY  circa age 10    Re-do. Home Medications:  None       Hospital Medications:  Current Facility-Administered Medications   Medication Dose Route Frequency    nicotine (NICODERM CQ) 21 mg/24 hr patch 1 Patch  1 Patch TransDERmal DAILY    potassium chloride SR (KLOR-CON 10) tablet 40 mEq  40 mEq Oral NOW    furosemide (LASIX) injection 40 mg  40 mg IntraVENous BID    hydrALAZINE (APRESOLINE) 20 mg/mL injection 10 mg  10 mg IntraVENous Q6H PRN    traMADoL (ULTRAM) tablet 50 mg  50 mg Oral Q6H PRN    LORazepam (ATIVAN) tablet 1 mg  1 mg Oral Q1H PRN    LORazepam (ATIVAN) tablet 2 mg  2 mg Oral C1A PRN    folic acid (FOLVITE) tablet 1 mg  1 mg Oral DAILY    thiamine HCL (B-1) tablet 100 mg  100 mg Oral DAILY    multivitamin, tx-iron-ca-min (THERA-M w/ IRON) tablet 1 Tablet  1 Tablet Oral DAILY    sodium chloride (NS) flush 5-40 mL  5-40 mL IntraVENous Q8H    sodium chloride (NS) flush 5-40 mL  5-40 mL IntraVENous PRN    polyethylene glycol (MIRALAX) packet 17 g  17 g Oral DAILY PRN    ondansetron (ZOFRAN) injection 4 mg  4 mg IntraVENous Q6H PRN       Allergies:  No Known Allergies    Family History:  No family history on file.     Social History:  Social History     Tobacco Use    Smoking status: Not on file   Substance Use Topics    Alcohol use: Not on file       Review of Systems:    Symptom Y/N Comments  Symptom Y/N Comments   Fever/Chills    Chest Pain     Cough    Abdominal Pain Y    Sputum    Joint Pain     SOB/MEDEIROS    Pruritis/Rash     Nausea/vomit    Tolerating PT/OT     Diarrhea Y   Tolerating Diet     Constipation    Other       Could NOT obtain due to:        Objective:     Patient Vitals for the past 8 hrs:   BP Temp Pulse Resp SpO2 Height Weight   05/25/21 1432 (!) 150/60 97.4 °F (36.3 °C) (!) 56 20 96 %   05/25/21 1358 (!) 162/76     6' (1.829 m) 80.7 kg (178 lb)     No intake/output data recorded. No intake/output data recorded. PHYSICAL EXAMINATION:    GENERAL:  No distress. Extensive tattoos. HEENT:  Anicteric. LUNGS:  Clear to auscultation bilaterally. HEART:  Regular rate and rhythm with no murmurs, gallops, or rubs. EXTREMITIES:  Marked bilateral lower extremity pitting edema. NEURO:  Alert and oriented. Data Review     Recent Labs     05/25/21  0330 05/24/21  1246   WBC 5.2 4.8   HGB 10.6* 11.6*   HCT 31.5* 34.5*   * 130*     Recent Labs     05/25/21  0330 05/24/21  1246    140   K 3.3* 3.5   * 110*   CO2 22 26   BUN 7 7   CREA 0.60* 0.73   GLU 98 99   CA 8.0* 8.2*     Recent Labs     05/25/21  0330 05/24/21  1246    145*   TP 5.4* 6.2*   ALB 2.0* 2.4*   GLOB 3.4 3.8   LPSE  --  168     Recent Labs     05/25/21  0330   INR 1.3*   PTP 13.3*   APTT 32.5*                       Assessment and Plan:      The patient appears to have a rectal mass that is highly suspicious for cancer. Of course the workup would normally include a colonoscopy, biopsies, and probably an endoscopic rectal ultrasound, and the treatment plan might include surgery (possibly preceded by neoadjuvant chemoradiation). I do not know whether or not he would be a surgical candidate from a physiologic standpoint, but he will never be one if he does not want to live. I recommended that he give serious thought to whether or not he actually wants to die now that there is the real possibility that he has a condition that if untreated will hasten his death. Please re-consult me if a diagnosis is made and if he is believed to be a surgical candidate.       Active Problems:    Hyperbilirubinemia (5/24/2021)      Anemia (5/24/2021)      Thrombocytopenia ([address] (5/24/2021)      Rectal mass (5/24/2021)      Generalized weakness (5/24/2021)      Scrotal edema (5/24/2021)      LFT elevation (5/24/2021) Cirrhosis of liver with ascites ([facility] (5/24/2021)      Edema of both lower extremities (5/24/2021)

## 2021-05-25 NOTE — PROGRESS NOTES
Occupational Therapy  05/25/21    Order acknowledged, chart reviewed. Noted patient has been up independently to/from bathroom per PT & RN. Met with patient who was quite agitated, stating \"I never stopped being my independent self, I'm fine. \" He reports no ADL/IADL and mobility concerns with no interest in participating with acute care therapies, therefore will sign off.      Thank you,   Santiago Yanes, OTRANJIT, OTR/L

## 2021-05-25 NOTE — PROGRESS NOTES
Transition of Care PLan  RUR  12%    Colon Rectal Surgeon consulted  GI consulted  DNR and AMD in chart. Disposition   TBD  Pending medical progress and recommendations/      Transportation   Friend/ BLS pending medical progress    Home Health  Will arrange if ordered    Medical follow up   PCP and specialist CM specialist Janak Sellers emailed with choice information     Contact  ANABELA Marquez  [phone]  [address]  [phone]    Reason for Admission:  Leg swelling and abdominal pain. cirrhosis of liver with ascites, rectal mass  Medical hx alcohol abuse, former IV drug use, leg swelling (edema)                        RUR Score:      12%               Plan for utilizing home health:  TBD        PCP: First and Last name:  None  [facility] been [facility]-- He does not go very often   Name of Practice:    Are you a current patient: Yes/No:    Approximate date of last visit:    Can you participate in a virtual visit with your PCP:                     Current Advanced Directive/Advance Care Plan: DNR      Healthcare Decision Maker:   Click here to complete [address] including selection of the Healthcare Decision Maker Relationship (ie \"Primary\")  Friend [facility]  [phone]   MERCEDEZA/ POA  In chart                      Transition of Care Plan:       TBD-- pending medical progress and recommendations    CM met with patient in his room. He was alert and oriented and confirmed demographics, having no PCP and insurance  Medicare only. He has no preferred pharmacy    Patient lives alone in his two level home. He was self care and independent--driving to daily activities. Uses no dme. He is an only child and when his parents passed away, he moved into the home. He eats mostly sandwiches and prepared foods. He never -- long time girlfriend passed away in 2010.    He has no children and no family   He said he is the only person left in his family. He said he has been very healthy and never secured a PCP. He would like an appointment with a physician in [facility] as it is close to him. CM  emailed CM specialist, Janak Sellers, informing her of patient choice for physician office. 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 He receives penitentiary and [employer]. Medicare pt has received, reviewed, and signed 1st IM letter informing them of their right to appeal the discharge. Signed copy has been placed on pt bedside chart. Patient will be evaluated by CR surgeon and GI. CM will follow and assist with any transition of care needs that arise.        Care Management Interventions  PCP Verified by CM:  (NO PCP)  Mode of Transport at Discharge:  (car vs TBD)  Transition of Care Consult (CM Consult): Discharge Planning  Discharge Durable Medical Equipment: No  Physical Therapy Consult: Yes  Occupational Therapy Consult: Yes  Current Support Network: Own Home (lives alone in two level home   self care and independent prior to admission   No family  good friend support    AMD in chart   DNR)  Confirm Follow Up Transport: Self (friend  POA)  Discharge Location  Discharge Placement: Home (TBD pending medical progress and recommendations)

## 2021-05-26 LAB
ALBUMIN SERPL-MCNC: 2 G/DL (ref 3.5–5)
ALBUMIN/GLOB SERPL: 0.6 {RATIO} (ref 1.1–2.2)
ALP SERPL-CCNC: 100 U/L (ref 45–117)
ALT SERPL-CCNC: 46 U/L (ref 12–78)
ANION GAP SERPL CALC-SCNC: 7 MMOL/L (ref 5–15)
AST SERPL-CCNC: 82 U/L (ref 15–37)
BASOPHILS # BLD: 0.1 K/UL (ref 0–0.1)
BASOPHILS NFR BLD: 1 % (ref 0–1)
BILIRUB SERPL-MCNC: 1.3 MG/DL (ref 0.2–1)
BUN SERPL-MCNC: 7 MG/DL (ref 6–20)
BUN/CREAT SERPL: 11 (ref 12–20)
CALCIUM SERPL-MCNC: 7.8 MG/DL (ref 8.5–10.1)
CEA SERPL-MCNC: 7.6 NG/ML
CHLORIDE SERPL-SCNC: 106 MMOL/L (ref 97–108)
CO2 SERPL-SCNC: 26 MMOL/L (ref 21–32)
CREAT SERPL-MCNC: 0.63 MG/DL (ref 0.7–1.3)
DIFFERENTIAL METHOD BLD: ABNORMAL
EOSINOPHIL # BLD: 0.3 K/UL (ref 0–0.4)
EOSINOPHIL NFR BLD: 6 % (ref 0–7)
ERYTHROCYTE [DISTWIDTH] IN BLOOD BY AUTOMATED COUNT: 12.3 % (ref 11.5–14.5)
GLOBULIN SER CALC-MCNC: 3.5 G/DL (ref 2–4)
GLUCOSE SERPL-MCNC: 83 MG/DL (ref 65–100)
HCT VFR BLD AUTO: 31.8 % (ref 36.6–50.3)
HGB BLD-MCNC: 10.8 G/DL (ref 12.1–17)
IMM GRANULOCYTES # BLD AUTO: 0 K/UL (ref 0–0.04)
IMM GRANULOCYTES NFR BLD AUTO: 0 % (ref 0–0.5)
LYMPHOCYTES # BLD: 1.7 K/UL (ref 0.8–3.5)
LYMPHOCYTES NFR BLD: 30 % (ref 12–49)
MCH RBC QN AUTO: 33.6 PG (ref 26–34)
MCHC RBC AUTO-ENTMCNC: 34 G/DL (ref 30–36.5)
MCV RBC AUTO: 99.1 FL (ref 80–99)
MONOCYTES # BLD: 0.6 K/UL (ref 0–1)
MONOCYTES NFR BLD: 11 % (ref 5–13)
NEUTS SEG # BLD: 2.9 K/UL (ref 1.8–8)
NEUTS SEG NFR BLD: 52 % (ref 32–75)
NRBC # BLD: 0 K/UL (ref 0–0.01)
NRBC BLD-RTO: 0 PER 100 WBC
PLATELET # BLD AUTO: 117 K/UL (ref 150–400)
PMV BLD AUTO: 10.5 FL (ref 8.9–12.9)
POTASSIUM SERPL-SCNC: 2.9 MMOL/L (ref 3.5–5.1)
PROT SERPL-MCNC: 5.5 G/DL (ref 6.4–8.2)
RBC # BLD AUTO: 3.21 M/UL (ref 4.1–5.7)
SARS-COV-2, XPLCVT: NOT DETECTED
SODIUM SERPL-SCNC: 139 MMOL/L (ref 136–145)
SOURCE, COVRS: NORMAL
WBC # BLD AUTO: 5.6 K/UL (ref 4.1–11.1)

## 2021-05-26 PROCEDURE — 74011250636 HC RX REV CODE- 250/636: Performed by: INTERNAL MEDICINE

## 2021-05-26 PROCEDURE — 74011250637 HC RX REV CODE- 250/637: Performed by: INTERNAL MEDICINE

## 2021-05-26 PROCEDURE — 65270000029 HC RM PRIVATE

## 2021-05-26 PROCEDURE — 82378 CARCINOEMBRYONIC ANTIGEN: CPT

## 2021-05-26 PROCEDURE — 85025 COMPLETE CBC W/AUTO DIFF WBC: CPT

## 2021-05-26 PROCEDURE — 80053 COMPREHEN METABOLIC PANEL: CPT

## 2021-05-26 PROCEDURE — 36415 COLL VENOUS BLD VENIPUNCTURE: CPT

## 2021-05-26 PROCEDURE — 74011250637 HC RX REV CODE- 250/637: Performed by: FAMILY MEDICINE

## 2021-05-26 RX ORDER — POLYETHYLENE GLYCOL 3350, SODIUM SULFATE, SODIUM CHLORIDE, POTASSIUM CHLORIDE, SODIUM ASCORBATE, AND ASCORBIC ACID 7.5-2.691G
2 KIT ORAL ONCE
Status: COMPLETED | OUTPATIENT
Start: 2021-05-27 | End: 2021-05-27

## 2021-05-26 RX ORDER — POTASSIUM CHLORIDE 7.45 MG/ML
10 INJECTION INTRAVENOUS
Status: COMPLETED | OUTPATIENT
Start: 2021-05-26 | End: 2021-05-26

## 2021-05-26 RX ADMIN — FUROSEMIDE 40 MG: 10 INJECTION, SOLUTION INTRAMUSCULAR; INTRAVENOUS at 09:37

## 2021-05-26 RX ADMIN — POTASSIUM CHLORIDE 10 MEQ: 7.46 INJECTION, SOLUTION INTRAVENOUS at 12:12

## 2021-05-26 RX ADMIN — Medication 100 MG: at 09:37

## 2021-05-26 RX ADMIN — FUROSEMIDE 40 MG: 10 INJECTION, SOLUTION INTRAMUSCULAR; INTRAVENOUS at 17:36

## 2021-05-26 RX ADMIN — Medication 10 ML: at 20:39

## 2021-05-26 RX ADMIN — POTASSIUM CHLORIDE 10 MEQ: 7.46 INJECTION, SOLUTION INTRAVENOUS at 09:38

## 2021-05-26 RX ADMIN — Medication 1 TABLET: at 09:37

## 2021-05-26 RX ADMIN — POTASSIUM CHLORIDE 10 MEQ: 7.46 INJECTION, SOLUTION INTRAVENOUS at 11:42

## 2021-05-26 RX ADMIN — Medication 10 ML: at 14:43

## 2021-05-26 RX ADMIN — Medication 10 ML: at 05:01

## 2021-05-26 RX ADMIN — POTASSIUM CHLORIDE 10 MEQ: 7.46 INJECTION, SOLUTION INTRAVENOUS at 10:42

## 2021-05-26 RX ADMIN — FOLIC ACID 1 MG: 1 TABLET ORAL at 09:37

## 2021-05-26 NOTE — CONSULTS
[address]  [address]  [place], [address]  [phone]                     GI CONSULTATION NOTE      NAME:  Hafsa Villarreal   :   1945   MRN:   [id number]     Consult Date: 2021     Chief Complaint: Rectal mass, cirrhosis    History of Present Illness:  Patient is a 76 y.o. who is seen in consultation at the request of Dr. Graham Nguyen for the above problems. He states that he does not follow with any doctors and was just told this admission that he has cirrhosis. CT  showed a small and nodular liver with portal HTN and extensive varices including large gastric varices. He denies nausea, vomiting and anorexia, stating that his appetite is good and he eats a lot. He is thin but denies weight loss. He states that he has been having bilateral lower abdominal pain for the past 1-2 years, more of a discomfort than actual pain, aggravated by having a BM. More recently he has been having frequent small BMs with BRB each time. He denies rectal pain, constipation and difficult evacuation. He has never had a colonoscopy. CT on admission showed a 3.6 cm annular mass in the mid-rectum, appearing transmural and spreading posterior-laterally to the left. He states that Dr. Rosemarie Quan explained the finding of the rectal mass to him yesterday and at that time he had declined evaluation. PMH:  Past Medical History:   Diagnosis Date    Alcohol abuse     No alcohol since 2020.  COVID-19 vaccine series started     Pfizer dose #1 received last week. Dose # 2 scheduled for 2021. PSH:  Past Surgical History:   Procedure Laterality Date    HX BACK SURGERY  1978    Lumbar    HX OTHER SURGICAL  1990s    Excision of benign left neck mass.  HX TONSILLECTOMY  circa age 10    HX TONSILLECTOMY  circa age 10    Re-do.        Allergies:  No Known Allergies    Home Medications:  None       Hospital Medications:  Current Facility-Administered Medications Medication Dose Route Frequency    [START ON 5/27/2021] peg 3350-Electrolytes-Vit C (MOVIPREP) oral solution 2 L  2 L Oral ONCE    nicotine (NICODERM CQ) 21 mg/24 hr patch 1 Patch  1 Patch TransDERmal DAILY    furosemide (LASIX) injection 40 mg  40 mg IntraVENous BID    hydrALAZINE (APRESOLINE) 20 mg/mL injection 10 mg  10 mg IntraVENous Q6H PRN    traMADoL (ULTRAM) tablet 50 mg  50 mg Oral Q6H PRN    LORazepam (ATIVAN) tablet 1 mg  1 mg Oral Q1H PRN    LORazepam (ATIVAN) tablet 2 mg  2 mg Oral S5L PRN    folic acid (FOLVITE) tablet 1 mg  1 mg Oral DAILY    thiamine HCL (B-1) tablet 100 mg  100 mg Oral DAILY    multivitamin, tx-iron-ca-min (THERA-M w/ IRON) tablet 1 Tablet  1 Tablet Oral DAILY    sodium chloride (NS) flush 5-40 mL  5-40 mL IntraVENous Q8H    sodium chloride (NS) flush 5-40 mL  5-40 mL IntraVENous PRN    polyethylene glycol (MIRALAX) packet 17 g  17 g Oral DAILY PRN    ondansetron (ZOFRAN) injection 4 mg  4 mg IntraVENous Q6H PRN       Social History:  Social History     Tobacco Use    Smoking status: Not on file   Substance Use Topics    Alcohol use: Not on file       Family History:  No family history on file.     Review of Systems:    Constitutional: negative fever, negative chills, negative weight loss  Eyes:   negative visual changes  ENT:   negative sore throat, tongue or lip swelling  Respiratory:  negative cough, negative dyspnea  Cards:  negative for chest pain, palpitations, lower extremity edema  GI:   See HPI  :  negative for frequency, dysuria  Integument:  negative for rash and pruritus  Heme:  negative for easy bruising and gum/nose bleeding  Musculoskel: negative for myalgias,  back pain and muscle weakness  Neuro: negative for headaches, dizziness, vertigo  Psych:  negative for feelings of anxiety, depression      Objective:     Patient Vitals for the past 8 hrs:   BP Temp Pulse Resp SpO2   05/26/21 1506 127/69 98.6 °F (37 °C) 61 18 98 %   05/26/21 0857 (!) 142/54 97.8 °F (36.6 °C) 70 17 97 %     05/26 0701 - 05/26 1900  In: 240 [P.O.:240]  Out: -   No intake/output data recorded. PHYSICAL EXAM:  General appearance: no distress, appears stated age elderly white male  Skin: Extremities and face reveal no rashes, pallor or jaundice, + tattoos  HEENT: Sclerae anicteric. Extra-occular muscles are intact. Cardiovascular: Regular rate and rhythm. No murmurs, gallops, or rubs. Respiratory: Comfortable breathing with no accessory muscle use. GI: Abdomen distended with mod ascites, soft. Generalized ttp, moderate in the lower abdomen  Rectal: Deferred   Musculoskeletal: diffuse pitting LE edema    Data Review     Recent Labs     05/26/21  0152 05/25/21  0330   WBC 5.6 5.2   HGB 10.8* 10.6*   HCT 31.8* 31.5*   * 117*     Recent Labs     05/26/21 0152 05/25/21  0330    138   K 2.9* 3.3*    110*   CO2 26 22   BUN 7 7   CREA 0.63* 0.60*   GLU 83 98   CA 7.8* 8.0*     Recent Labs     05/26/21  0152 05/25/21  0330 05/24/21  1246    104 145*   TP 5.5* 5.4* 6.2*   ALB 2.0* 2.0* 2.4*   GLOB 3.5 3.4 3.8   LPSE  --   --  168     Recent Labs     05/25/21  0330   INR 1.3*   PTP 13.3*   APTT 32.5*        Imaging studies reviewed    Assessment / Plan :     Rectal mass, rectal bleeding, lower abdominal pain  - CT with IV contrast 5/24/21 - 3.6 cm annular mass in the mid rectum.  There appears to be transmural spread posterior laterally on the left  - Dr. Jan Chinchilla     Decompensated cirrhosis, history of prior alcohol abuse and report of hepatitis C  - CT with IV contrast 5/24/21 - Cirrhosis with portal hypertension and extensive varices including large gastric varices  - Hepatology consult pending    Hypokalemia  - Repletion per hospitalist     Patient Active Hospital Problem List:   Active Problems:    Hyperbilirubinemia (5/24/2021)      Anemia (5/24/2021)      Thrombocytopenia ([address] (5/24/2021)      Rectal mass (5/24/2021)      Generalized weakness (5/24/2021)      Scrotal edema (5/24/2021)      LFT elevation (5/24/2021)      Cirrhosis of liver with ascites ([facility] (5/24/2021)      Edema of both lower extremities (5/24/2021)    I have personally reviewed the history and independently examined the patient. I have reviewed the chart and agree with the documentation recorded by the Mid Level Provider, including the assessment, treatment plan, and disposition. ASSESSMENT AND PLAN:  I discussed with patient and POA role of upper endoscopy (EV evaluation) and colonoscopy. He requests to further discuss next steps with CRS, though he thinks he is amenable to egd/colonoscopy Friday AM with me.      Jesus Dunne MD

## 2021-05-26 NOTE — PROGRESS NOTES
General Daily Progress Note    Admission Date: 5/24/2021  Hospital Day 3  Post-Op Day Not Applicable  Subjective:   Voiding frequently due to diuretics. Less lower extremity swelling. Less lower abdominal pain. Tolerating diet. Objective:     Patient Vitals for the past 24 hrs:   BP Temp Pulse Resp SpO2 Weight   05/26/21 1506 127/69 98.6 °F (37 °C) 61 18 98 %    05/26/21 0857 (!) 142/54 97.8 °F (36.6 °C) 70 17 97 %    05/26/21 0400      80.7 kg (177 lb 14.6 oz)   05/26/21 0205 (!) 120/54 98.2 °F (36.8 °C) 70 17 96 %      No intake/output data recorded. 05/25 0701 - 05/26 1900  In: 240 [P.O.:240]  Out: -     PHYSICAL EXAMINATION:    GENERAL:  No distress. Extensive tattoos. Bettey Grad EXTREMITIES:  Marked bilateral lower extremity pitting edema. NEURO:  Alert and oriented. Data Review   Recent Results (from the past 24 hour(s))   METABOLIC PANEL, COMPREHENSIVE    Collection Time: 05/26/21  1:52 AM   Result Value Ref Range    Sodium 139 136 - 145 mmol/L    Potassium 2.9 (L) 3.5 - 5.1 mmol/L    Chloride 106 97 - 108 mmol/L    CO2 26 21 - 32 mmol/L    Anion gap 7 5 - 15 mmol/L    Glucose 83 65 - 100 mg/dL    BUN 7 6 - 20 MG/DL    Creatinine 0.63 (L) 0.70 - 1.30 MG/DL    BUN/Creatinine ratio 11 (L) 12 - 20      GFR est AA >60 >60 ml/min/1.73m2    GFR est non-AA >60 >60 ml/min/1.73m2    Calcium 7.8 (L) 8.5 - 10.1 MG/DL    Bilirubin, total 1.3 (H) 0.2 - 1.0 MG/DL    ALT (SGPT) 46 12 - 78 U/L    AST (SGOT) 82 (H) 15 - 37 U/L    Alk.  phosphatase 100 45 - 117 U/L    Protein, total 5.5 (L) 6.4 - 8.2 g/dL    Albumin 2.0 (L) 3.5 - 5.0 g/dL    Globulin 3.5 2.0 - 4.0 g/dL    A-G Ratio 0.6 (L) 1.1 - 2.2     CBC WITH AUTOMATED DIFF    Collection Time: 05/26/21  1:52 AM   Result Value Ref Range    WBC 5.6 4.1 - 11.1 K/uL    RBC 3.21 (L) 4.10 - 5.70 M/uL    HGB 10.8 (L) 12.1 - 17.0 g/dL    HCT 31.8 (L) 36.6 - 50.3 %    MCV 99.1 (H) 80.0 - 99.0 FL    MCH 33.6 26.0 - 34.0 PG    MCHC 34.0 30.0 - 36.5 g/dL    RDW 12.3 11.5 - 14.5 %    PLATELET 083 (L) 764 - 400 K/uL    MPV 10.5 8.9 - 12.9 FL    NRBC 0.0 0  WBC    ABSOLUTE NRBC 0.00 0.00 - 0.01 K/uL    NEUTROPHILS 52 32 - 75 %    LYMPHOCYTES 30 12 - 49 %    MONOCYTES 11 5 - 13 %    EOSINOPHILS 6 0 - 7 %    BASOPHILS 1 0 - 1 %    IMMATURE GRANULOCYTES 0 0.0 - 0.5 %    ABS. NEUTROPHILS 2.9 1.8 - 8.0 K/UL    ABS. LYMPHOCYTES 1.7 0.8 - 3.5 K/UL    ABS. MONOCYTES 0.6 0.0 - 1.0 K/UL    ABS. EOSINOPHILS 0.3 0.0 - 0.4 K/UL    ABS. BASOPHILS 0.1 0.0 - 0.1 K/UL    ABS. IMM. GRANS. 0.0 0.00 - 0.04 K/UL    DF AUTOMATED     CEA    Collection Time: 05/26/21  1:52 AM   Result Value Ref Range    CEA 7.6 ng/mL           Assessment and Plan: We had a discussion lasting more than 20 minutes during which I gave the patient and his Power of  an overview of rectal cancer staging and how that staging can affect the treatment recommendations. The patient asked appropriate questions, and I attempted to answer them. We did not discuss surgery in detail, but I did tell him, because he asked, that whether or not a colostomy might be needed would depend largely upon the distance of the tumor from the anus and that that would be one of the things that the examination of the colon and rectum in Endoscopy would tell us. I offered to perform a digital rectal examination today, but he did not want me to do it. He has decided to proceed with the EGD and colonoscopy with Dr. Richa Perkins, and he understands that that means he will need to complete a bowel preparation tomorrow. I will see him again after those procedures have been performed.      Active Problems:    Hyperbilirubinemia (5/24/2021)      Anemia (5/24/2021)      Thrombocytopenia ([address] (5/24/2021)      Rectal mass (5/24/2021)      Generalized weakness (5/24/2021)      Scrotal edema (5/24/2021)      LFT elevation (5/24/2021)      Cirrhosis of liver with ascites ([address] (5/24/2021)      Edema of both lower extremities (5/24/2021)

## 2021-05-26 NOTE — PROGRESS NOTES
[address] [facility] Adult  Hospitalist Group                                                                                          Hospitalist Progress Note  Kimberli Jordan MD  Answering service: [phone] or [phone] from in house phone        Date of Service:  2021  NAME:  Cindy Choi  :  1945  MRN:  [id number]      Admission Summary:   75 yo male admitted for leg pain and abd pain, found to have cirrhosis of the liver with ascites and rectal mass. Work up to include echo, duplex US pending. Hepatologist and CRS consulted. Interval history / Subjective:   Pt awake, NAD. Pt states he has multiple episodes of diarrhea yesterday. He is requesting to meet with colorectal surgeon again. Assessment & Plan:     Cirrhosis of liver with ascites in the setting of alcohol abuse and remote IV drug use. Generalized abdominal pain  Hyperbilirubinemia/transaminitis  Thrombocytopenia  Cirrhosis with portal hypertension and extensive varices including large  gastric varices seen on CT a/p  hepc C ab +  Cont' IV lasix  Monitor LFT  Cont' CIWA, MVI, folic acid, thiamine  Hepatologist/GI consulted           Rectal mass  CT a/p showed 3.6 cm annular mass in the mid rectum. Colorectal surgeon's note is reviewed. Pt states he was un decisive yesterday but is willing to undergo further workup to include biopsy, surgery if that is offered. Will re-consult  GI also consulted, I spoke to Joel  98.. Anemia   Iron profile suggestive of chronic disease. B12 wnl, and folate levels  Daily labs, transfuse for hgb<7  FOBT neg      Generalized weakness   Required assistance to stand; place on fall precautions  Refused PT/OT, will need to re-consult if pt in agreement.     Pulmonary edema  Anasarca   Former used diuretics but patient stopped due to \"diarrhea\". CXR showed interstitial pulmonary edema or chronic interstitial lung disease more likely  than interstitial pneumonitis given the clinical history. Nonspecific sclerosis in the posterior right fourth rib. Consider non emergent bone scan  Probnp 744  B/l duplex US neg for DVT, Echo showed nl EF   Cont' IV lasix BID  Keep legs elevated at rest, daily wt, strict I&O  Monitor fluid status with strict Is and Os / daily weights         Hypokalemia, K 2.9, pt refusing PO KCl, will replete with IV KCl, check mag    Tobacco abuse  Encourage smoking cessation  Nicotine patch     VTE prophylaxis:  SCDs to BLEs     ADVANCED DIRECTIVE/ CODE STATUS:  DO NOT RESUSCITATE (DNR) as discussed with both patient and his power of . Patient signed durable DNR form. Hospital Problems  Never Reviewed        Codes Class Noted POA    Hyperbilirubinemia ICD-10-CM: E80.6  ICD-9-CM: 782.4  5/24/2021 Unknown        Anemia ICD-10-CM: D64.9  ICD-9-CM: 285.9  5/24/2021 Unknown        Thrombocytopenia ([facility] ICD-10-CM: D69.6  ICD-9-CM: 287.5  5/24/2021 Unknown        Rectal mass ICD-10-CM: K62.89  ICD-9-CM: 787.99  5/24/2021 Unknown        Generalized weakness ICD-10-CM: R53.1  ICD-9-CM: 780.79  5/24/2021 Unknown        Scrotal edema ICD-10-CM: N50.89  ICD-9-CM: 608.86  5/24/2021 Unknown        LFT elevation ICD-10-CM: R79.89  ICD-9-CM: 790.6  5/24/2021 Unknown        Cirrhosis of liver with ascites ([facility] ICD-10-CM: K74.60, R18.8  ICD-9-CM: 571.5  5/24/2021 Unknown        Edema of both lower extremities ICD-10-CM: R60.0  ICD-9-CM: 782.3  5/24/2021 Unknown                Review of Systems:   A comprehensive review of systems was negative except for that written in the HPI. Vital Signs:    Last 24hrs VS reviewed since prior progress note.  Most recent are:  Visit Vitals  BP (!) 142/54 (BP 1 Location: Right upper arm, BP Patient Position: At rest)   Pulse 70   Temp 97.8 °F (36.6 °C)   Resp 17   Ht 6' (1.829 m)   Wt 80.7 kg (177 lb 14.6 oz)   SpO2 97%   BMI 24.13 kg/m²       No intake or output data in the 24 hours ending 05/26/21 0859     Physical Examination:     I had a face to face encounter with this patient and independently examined them on 5/26/2021 as outlined below:          Constitutional:  No acute distress, cooperative, pleasant    ENT:  Oral mucosa moist, oropharynx benign. Resp:  CTA bilaterally. No wheezing/rhonchi/rales. No accessory muscle use   CV:  Regular rhythm, normal rate, no murmurs, gallops, rubs    GI:  Soft, non distended, non tender. normoactive bowel sounds, no hepatosplenomegaly     Musculoskeletal:  b/l LE edema up to thighs, + scrotal edema, warm, 2+ pulses throughout    Neurologic:  Moves all extremities. AAOx3, CN II-XII reviewed            Data Review:    Review and/or order of clinical lab test      Labs:     Recent Labs     05/26/21 0152 05/25/21  0330   WBC 5.6 5.2   HGB 10.8* 10.6*   HCT 31.8* 31.5*   * 117*     Recent Labs     05/26/21  0152 05/25/21  0330 05/24/21  1246    138 140   K 2.9* 3.3* 3.5    110* 110*   CO2 26 22 26   BUN 7 7 7   CREA 0.63* 0.60* 0.73   GLU 83 98 99   CA 7.8* 8.0* 8.2*   MG  --  1.9  --      Recent Labs     05/26/21  0152 05/25/21  0330 05/24/21  1246   ALT 46 42 49    104 145*   TBILI 1.3* 1.4* 1.3*   TP 5.5* 5.4* 6.2*   ALB 2.0* 2.0* 2.4*   GLOB 3.5 3.4 3.8   LPSE  --   --  168     Recent Labs     05/25/21  0330   INR 1.3*   PTP 13.3*   APTT 32.5*      Recent Labs     05/25/21  0330   TIBC 240*   PSAT 80*   FERR 656*      Lab Results   Component Value Date/Time    Folate 7.6 05/25/2021 03:30 AM      No results for input(s): PH, PCO2, PO2 in the last 72 hours.   Recent Labs     05/24/21  1246   TROIQ <0.05     No results found for: CHOL, CHOLX, CHLST, CHOLV, HDL, HDLP, LDL, LDLC, DLDLP, TGLX, TRIGL, TRIGP, CHHD, CHHDX  No results found for: Lisa Falcon  Lab Results   Component Value Date/Time    Color YELLOW/STRAW 05/24/2021 06:09 PM    Appearance CLEAR 05/24/2021 06:09 PM    Specific gravity 1.010 05/24/2021 06:09 PM    pH (UA) 7.5 05/24/2021 06:09 PM    Protein Negative 05/24/2021 06:09 PM Glucose Negative 05/24/2021 06:09 PM    Ketone Negative 05/24/2021 06:09 PM    Bilirubin Negative 05/24/2021 06:09 PM    Urobilinogen 1.0 05/24/2021 06:09 PM    Nitrites Negative 05/24/2021 06:09 PM    Leukocyte Esterase Negative 05/24/2021 06:09 PM         Medications Reviewed:     Current Facility-Administered Medications   Medication Dose Route Frequency    potassium chloride 10 mEq in 100 ml IVPB  10 mEq IntraVENous Q1H    nicotine (NICODERM CQ) 21 mg/24 hr patch 1 Patch  1 Patch TransDERmal DAILY    furosemide (LASIX) injection 40 mg  40 mg IntraVENous BID    hydrALAZINE (APRESOLINE) 20 mg/mL injection 10 mg  10 mg IntraVENous Q6H PRN    traMADoL (ULTRAM) tablet 50 mg  50 mg Oral Q6H PRN    LORazepam (ATIVAN) tablet 1 mg  1 mg Oral Q1H PRN    LORazepam (ATIVAN) tablet 2 mg  2 mg Oral H5D PRN    folic acid (FOLVITE) tablet 1 mg  1 mg Oral DAILY    thiamine HCL (B-1) tablet 100 mg  100 mg Oral DAILY    multivitamin, tx-iron-ca-min (THERA-M w/ IRON) tablet 1 Tablet  1 Tablet Oral DAILY    sodium chloride (NS) flush 5-40 mL  5-40 mL IntraVENous Q8H    sodium chloride (NS) flush 5-40 mL  5-40 mL IntraVENous PRN    polyethylene glycol (MIRALAX) packet 17 g  17 g Oral DAILY PRN    ondansetron (ZOFRAN) injection 4 mg  4 mg IntraVENous Q6H PRN     ______________________________________________________________________  EXPECTED LENGTH OF STAY: 3-4 days  ACTUAL LENGTH OF STAY:          2                 Elisa Hammond MD

## 2021-05-27 LAB
ALBUMIN SERPL-MCNC: 2.1 G/DL (ref 3.5–5)
ALBUMIN/GLOB SERPL: 0.6 {RATIO} (ref 1.1–2.2)
ALP SERPL-CCNC: 115 U/L (ref 45–117)
ALT SERPL-CCNC: 46 U/L (ref 12–78)
ANION GAP SERPL CALC-SCNC: 7 MMOL/L (ref 5–15)
AST SERPL-CCNC: 81 U/L (ref 15–37)
BASOPHILS # BLD: 0.1 K/UL (ref 0–0.1)
BASOPHILS NFR BLD: 1 % (ref 0–1)
BILIRUB SERPL-MCNC: 1.2 MG/DL (ref 0.2–1)
BUN SERPL-MCNC: 8 MG/DL (ref 6–20)
BUN/CREAT SERPL: 11 (ref 12–20)
CALCIUM SERPL-MCNC: 8.1 MG/DL (ref 8.5–10.1)
CHLORIDE SERPL-SCNC: 104 MMOL/L (ref 97–108)
CO2 SERPL-SCNC: 27 MMOL/L (ref 21–32)
CREAT SERPL-MCNC: 0.7 MG/DL (ref 0.7–1.3)
DIFFERENTIAL METHOD BLD: ABNORMAL
EOSINOPHIL # BLD: 0.4 K/UL (ref 0–0.4)
EOSINOPHIL NFR BLD: 6 % (ref 0–7)
ERYTHROCYTE [DISTWIDTH] IN BLOOD BY AUTOMATED COUNT: 12.5 % (ref 11.5–14.5)
GLOBULIN SER CALC-MCNC: 3.3 G/DL (ref 2–4)
GLUCOSE SERPL-MCNC: 78 MG/DL (ref 65–100)
HCT VFR BLD AUTO: 32.5 % (ref 36.6–50.3)
HGB BLD-MCNC: 11 G/DL (ref 12.1–17)
IMM GRANULOCYTES # BLD AUTO: 0 K/UL (ref 0–0.04)
IMM GRANULOCYTES NFR BLD AUTO: 0 % (ref 0–0.5)
LYMPHOCYTES # BLD: 1.8 K/UL (ref 0.8–3.5)
LYMPHOCYTES NFR BLD: 31 % (ref 12–49)
MAGNESIUM SERPL-MCNC: 1.8 MG/DL (ref 1.6–2.4)
MCH RBC QN AUTO: 33.8 PG (ref 26–34)
MCHC RBC AUTO-ENTMCNC: 33.8 G/DL (ref 30–36.5)
MCV RBC AUTO: 100 FL (ref 80–99)
MONOCYTES # BLD: 0.6 K/UL (ref 0–1)
MONOCYTES NFR BLD: 11 % (ref 5–13)
NEUTS SEG # BLD: 2.9 K/UL (ref 1.8–8)
NEUTS SEG NFR BLD: 51 % (ref 32–75)
NRBC # BLD: 0 K/UL (ref 0–0.01)
NRBC BLD-RTO: 0 PER 100 WBC
PLATELET # BLD AUTO: 113 K/UL (ref 150–400)
PMV BLD AUTO: 10.4 FL (ref 8.9–12.9)
POTASSIUM SERPL-SCNC: 3 MMOL/L (ref 3.5–5.1)
PROT SERPL-MCNC: 5.4 G/DL (ref 6.4–8.2)
RBC # BLD AUTO: 3.25 M/UL (ref 4.1–5.7)
SODIUM SERPL-SCNC: 138 MMOL/L (ref 136–145)
WBC # BLD AUTO: 5.7 K/UL (ref 4.1–11.1)

## 2021-05-27 PROCEDURE — 74011250636 HC RX REV CODE- 250/636: Performed by: HOSPITALIST

## 2021-05-27 PROCEDURE — 99223 1ST HOSP IP/OBS HIGH 75: CPT | Performed by: HOSPITALIST

## 2021-05-27 PROCEDURE — 36415 COLL VENOUS BLD VENIPUNCTURE: CPT

## 2021-05-27 PROCEDURE — P9047 ALBUMIN (HUMAN), 25%, 50ML: HCPCS | Performed by: HOSPITALIST

## 2021-05-27 PROCEDURE — 83735 ASSAY OF MAGNESIUM: CPT

## 2021-05-27 PROCEDURE — 74011250636 HC RX REV CODE- 250/636: Performed by: INTERNAL MEDICINE

## 2021-05-27 PROCEDURE — 85025 COMPLETE CBC W/AUTO DIFF WBC: CPT

## 2021-05-27 PROCEDURE — 74011250637 HC RX REV CODE- 250/637: Performed by: FAMILY MEDICINE

## 2021-05-27 PROCEDURE — 80053 COMPREHEN METABOLIC PANEL: CPT

## 2021-05-27 PROCEDURE — 65270000029 HC RM PRIVATE

## 2021-05-27 PROCEDURE — 74011250637 HC RX REV CODE- 250/637: Performed by: INTERNAL MEDICINE

## 2021-05-27 PROCEDURE — 74011000250 HC RX REV CODE- 250: Performed by: PHYSICIAN ASSISTANT

## 2021-05-27 RX ORDER — ALBUMIN HUMAN 250 G/1000ML
25 SOLUTION INTRAVENOUS EVERY 6 HOURS
Status: DISCONTINUED | OUTPATIENT
Start: 2021-05-27 | End: 2021-05-29 | Stop reason: HOSPADM

## 2021-05-27 RX ORDER — POTASSIUM CHLORIDE 7.45 MG/ML
10 INJECTION INTRAVENOUS
Status: COMPLETED | OUTPATIENT
Start: 2021-05-27 | End: 2021-05-27

## 2021-05-27 RX ADMIN — FUROSEMIDE 40 MG: 10 INJECTION, SOLUTION INTRAMUSCULAR; INTRAVENOUS at 09:08

## 2021-05-27 RX ADMIN — POTASSIUM CHLORIDE 10 MEQ: 10 INJECTION, SOLUTION INTRAVENOUS at 15:15

## 2021-05-27 RX ADMIN — FOLIC ACID 1 MG: 1 TABLET ORAL at 09:05

## 2021-05-27 RX ADMIN — Medication 100 MG: at 09:05

## 2021-05-27 RX ADMIN — ALBUMIN (HUMAN) 25 G: 0.25 INJECTION, SOLUTION INTRAVENOUS at 18:16

## 2021-05-27 RX ADMIN — Medication 10 ML: at 15:15

## 2021-05-27 RX ADMIN — FUROSEMIDE 40 MG: 10 INJECTION, SOLUTION INTRAMUSCULAR; INTRAVENOUS at 18:16

## 2021-05-27 RX ADMIN — POTASSIUM CHLORIDE 10 MEQ: 10 INJECTION, SOLUTION INTRAVENOUS at 10:48

## 2021-05-27 RX ADMIN — POLYETHYLENE GLYCOL 3350, SODIUM SULFATE, SODIUM CHLORIDE, POTASSIUM CHLORIDE, ASCORBIC ACID, SODIUM ASCORBATE 2 L: KIT at 15:12

## 2021-05-27 RX ADMIN — Medication 1 TABLET: at 09:05

## 2021-05-27 RX ADMIN — POTASSIUM CHLORIDE 10 MEQ: 10 INJECTION, SOLUTION INTRAVENOUS at 13:11

## 2021-05-27 NOTE — PROGRESS NOTES
Problem: General Medical Care Plan  Goal: *Vital signs within specified parameters  Outcome: Progressing Towards Goal  Goal: *Labs within defined limits  Outcome: Progressing Towards Goal  Goal: *Absence of infection signs and symptoms  Outcome: Progressing Towards Goal  Goal: *Optimal pain control at patient's stated goal  Outcome: Progressing Towards Goal  Goal: *Skin integrity maintained  Outcome: Progressing Towards Goal  Goal: *Fluid volume balance  Outcome: Progressing Towards Goal  Goal: *Optimize nutritional status  Outcome: Progressing Towards Goal  Goal: *Anxiety reduced or absent  Outcome: Progressing Towards Goal  Goal: *Progressive mobility and function (eg: ADL's)  Outcome: Progressing Towards Goal     Problem: Patient Education: Go to Patient Education Activity  Goal: Patient/Family Education  Outcome: Progressing Towards Goal     Problem: Falls - Risk of  Goal: *Absence of Falls  Description: Document Angel Fall Risk and appropriate interventions in the flowsheet.   Outcome: Progressing Towards Goal  Note: Fall Risk Interventions:            Medication Interventions: Evaluate medications/consider consulting pharmacy, Teach patient to arise slowly                   Problem: Patient Education: Go to Patient Education Activity  Goal: Patient/Family Education  Outcome: Progressing Towards Goal

## 2021-05-27 NOTE — CONSULTS
[address] [facility], MD, Adriana Smith MD, MPH      AGUSTO Machuca, [facility]THONY Peterson [facility] [place]-BC   DIANNE Almaguer [facility]       [facility]    at [address], [facility] Josué Huizar  22.    [phone]    FAX: [address] [place] [address], [address]    [phone]    FAX: [phone]         HEPATOLOGY CONSULT NOTE  I was asked to see this patient in consultation by Dr Ulises Smith for management of cirrhosis c/b ascites  I have reviewed the Emergency room note, Hospital admission note,  Notes by all other physicians who have seen the patient during this hospitalization to date. I have reviewed the problem list and the reason for this hospitalization. I have reviewed the allergies and the medications the patient was taking at home prior to this hospitalization. HISTORY:  Yelitza Cook is a 76year old male with history of alcohol abuse, intravenous drug use. He presented to the ED with abdominal pain and lower extremity swelling. Labs performed in the ED showed WBC 4.8, hemoglobin 11.6, ALT 49, AST 80, , Albumin 2.4, Total bili 1.3,     Serologic markers was causes of chronic liver disease was positive for HCV Ab . Hep A Igm and Hep BsAg was negative. Cross-sectional imaging of the abdomen with CT demonstrated cirrhosis with portal hypertension and extensive varices including gastric varices. 3.6 cm annular mass in the mid rectum    He has worsening bilateral lower extremity swelling with scrotal swelling.   There is no ascites, hematemesis or hematochezia    Colorectal surgery has been consulted and  regarding rectal mass.  He is currently on Lasix 40 mg IV twice daily. He has a history of heavy alcohol intake. He used to drink a lot of beer. He officially last drank alcohol in 01/30/2020. ASSESSMENT AND PLAN:  Cirrhosis  The diagnosis of cirrhosis is based on imaging, laboratory studies and complications of cirrhosis  The etiology of cirrhosis is presumed due to alcohol and chronic HCV     ALT is normal, AST is elevated, ALP is normal, liver function is depressed, the platelet count is depressed. The CTP score is 7, Child Class A, MELD score 10    We will perform additional serologies to rule out other causes of chronic liver disease    Lower extremity edema  This is due to cirrhosis  Continue lasix 40 mg BID  We will initiate I.V albumin 25%, 25 gm every 6 hours    Chronic HCV  Chronic HCV with cirrhosis. Will check HCV viral load, HCV genotype to define the specific treatment and duration of treatment that will be required. Will perform serologic and virologic studies to assess for other causes of chronic liver disease. Patient should follow up on an outpatient basis for treatment of HCV    Chronic HCV Treatment  The patient has not been treated for HCV. The patient should be treated with Harvoni (sofosbuvir and ledipasvir), Mavyret (glecaprevir and piprentasvir)   Epclusa (sofosbuvir and velpatasvir) Vosevi (sofosbuvir, velpatasvir, voxalaprevir) and ribavirin for 8 weeks -12 weeks. The SVR/cure rate is over 95%. Screening for Esophageal varices  EGD to screen for EV has not be performed. He is scheduled for EGD and colonoscopy tomorrow    Rectal mass  He has a 3.6 cm annular mass in the mid rectum. He is scheduled for colonoscopy and biopsy of rectal mass tomorrow.   GI and colorectal surgery is in consult     Hepatic encephalopathy prophylaxis:  Overt HE has not developed  There is no need for lactulose  -Monitor electrolytes, specifically goal K> 4 as hypokalemia can lead to increased ammonia production. -Minimize opiates, benzodiazepines, and other sedating/constipating medications. Screening for [address]  There was no focal hepatic lesion on CT imaging of the liver recently performed. We will repeat imaging of the liver in 6 months    Nutrition  - 2 g sodium diet  - Ensure 1 to 1.5 g/kg protein daily  - Fluid restriction if sodium less than 125  - Consult nutrition for optimization of diets and patient education    Health maintenance:  -Hepatitis A virus vaccination:  -Hepatitis B virus vaccination  -Variceal screening  -Hepatology follow-up appointment    Thank you for this consult. Please do not hesitate to call with questions or concerns      SYSTEM REVIEW:  Constitution systems: Negative for fever, chills, weight gain, weight loss. Eyes: Negative for visual changes. ENT: Negative for sore throat, painful swallowing. Respiratory: Negative for cough, hemoptysis, SOB. Cardiology: Negative for chest pain, palpitations. GI:  Negative for constipation or diarrhea. : Negative for urinary frequency, dysuria, hematuria, nocturia. Skin: Negative for rash. Hematology: Negative for easy bruising, blood clots. Musculo-skelatal: Negative for back pain, muscle pain, weakness. Neurologic: Negative for headaches, dizziness, vertigo, memory problems not related to HE. Psychology: Negative for anxiety, depression. FAMILY HISTORY:  There is no family history of liver disease. There is no family history of immune disorders. SOCIAL HISTORY:  The patient is single  The patient has no children. He had a son who   The patient smokes 1-2 cig at night  The patient has previously consumed alcohol in excess. The patient is retired. He used to be a     PHYSICAL EXAMINATION:  VS: per nursing note  General: No acute distress. Eyes: Sclera anicteric. ENT:  No oral lesions. Thyroid normal.  Nodes:  No adenopathy. Skin: Positive spider angiomata. No jaundice.  Positive palmar erythema. Respiratory: Lungs clear to auscultation. Cardiovascular: Regular heart rate. No JVD. Abdomen: Soft non-tender, No obvious ascites. Extremities: 3+ lower extremity edema.  + muscle wasting. No gross arthritic changes. Neurologic: Alert and oriented. Cranial nerves grossly intact. No asterixis. LABORATORY:  Results for Denice Johnson (MRN [id number]) as of 5/27/2021 16:37   Ref. Range 5/26/2021 01:52 5/27/2021 03:03   WBC Latest Ref Range: 4.1 - 11.1 K/uL 5.6 5.7   HGB Latest Ref Range: 12.1 - 17.0 g/dL 10.8 (L) 11.0 (L)   PLATELET Latest Ref Range: 150 - 400 K/uL 117 (L) 113 (L)   Sodium Latest Ref Range: 136 - 145 mmol/L 139 138   Potassium Latest Ref Range: 3.5 - 5.1 mmol/L 2.9 (L) 3.0 (L)   Chloride Latest Ref Range: 97 - 108 mmol/L 106 104   CO2 Latest Ref Range: 21 - 32 mmol/L 26 27   Anion gap Latest Ref Range: 5 - 15 mmol/L 7 7   Glucose Latest Ref Range: 65 - 100 mg/dL 83 78   BUN Latest Ref Range: 6 - 20 MG/DL 7 8   Creatinine Latest Ref Range: 0.70 - 1.30 MG/DL 0.63 (L) 0.70   Magnesium Latest Ref Range: 1.6 - 2.4 mg/dL  1.8   GFR est non-AA Latest Ref Range: >60 ml/min/1.73m2 >60 >60   GFR est AA Latest Ref Range: >60 ml/min/1.73m2 >60 >60   Bilirubin, total Latest Ref Range: 0.2 - 1.0 MG/DL 1.3 (H) 1.2 (H)   Protein, total Latest Ref Range: 6.4 - 8.2 g/dL 5.5 (L) 5.4 (L)   Albumin Latest Ref Range: 3.5 - 5.0 g/dL 2.0 (L) 2.1 (L)   ALT Latest Ref Range: 12 - 78 U/L 46 46   AST Latest Ref Range: 15 - 37 U/L 82 (H) 81 (H)   Alk. phosphatase Latest Ref Range: 45 - 117 U/L 100 115   CEA Latest Units: ng/mL 7.6        RADIOLOGY:  5/24/2021: Cross-sectional imaging of the abdomen with CT demonstrated cirrhosis with portal hypertension and extensive varices including gastric varices.  3.6 cm annular mass in the mid rectum    Ulises Hess MD, MPH  Advanced Hepatology  [address] [place] [address].  [facility], [address] [place] [address], [address] [place] [address] Yonis

## 2021-05-27 NOTE — PROGRESS NOTES
[address]  [address], [address]  [phone]                     GI PROGRESS NOTE    Patient Name: Omega Mendoza      : 1945      MRN: [id number]  Admit Date: 2021  Today's Date: 2021    Subjective: Informed nurse Jay Hagen, of procedures tomorrow. Instructed to ensure patient completes bowel prep prior to midnight. If she has any questions call GI. Objective:     Blood pressure 134/65, pulse 65, temperature 97.5 °F (36.4 °C), resp. rate 18, height 6' (1.829 m), weight 80.6 kg (177 lb 11.1 oz), SpO2 96 %. Data Review:    Recent Results (from the past 24 hour(s))   METABOLIC PANEL, COMPREHENSIVE    Collection Time: 21  3:03 AM   Result Value Ref Range    Sodium 138 136 - 145 mmol/L    Potassium 3.0 (L) 3.5 - 5.1 mmol/L    Chloride 104 97 - 108 mmol/L    CO2 27 21 - 32 mmol/L    Anion gap 7 5 - 15 mmol/L    Glucose 78 65 - 100 mg/dL    BUN 8 6 - 20 MG/DL    Creatinine 0.70 0.70 - 1.30 MG/DL    BUN/Creatinine ratio 11 (L) 12 - 20      GFR est AA >60 >60 ml/min/1.73m2    GFR est non-AA >60 >60 ml/min/1.73m2    Calcium 8.1 (L) 8.5 - 10.1 MG/DL    Bilirubin, total 1.2 (H) 0.2 - 1.0 MG/DL    ALT (SGPT) 46 12 - 78 U/L    AST (SGOT) 81 (H) 15 - 37 U/L    Alk.  phosphatase 115 45 - 117 U/L    Protein, total 5.4 (L) 6.4 - 8.2 g/dL    Albumin 2.1 (L) 3.5 - 5.0 g/dL    Globulin 3.3 2.0 - 4.0 g/dL    A-G Ratio 0.6 (L) 1.1 - 2.2     MAGNESIUM    Collection Time: 21  3:03 AM   Result Value Ref Range    Magnesium 1.8 1.6 - 2.4 mg/dL   CBC WITH AUTOMATED DIFF    Collection Time: 21  3:03 AM   Result Value Ref Range    WBC 5.7 4.1 - 11.1 K/uL    RBC 3.25 (L) 4.10 - 5.70 M/uL    HGB 11.0 (L) 12.1 - 17.0 g/dL    HCT 32.5 (L) 36.6 - 50.3 %    .0 (H) 80.0 - 99.0 FL    MCH 33.8 26.0 - 34.0 PG    MCHC 33.8 30.0 - 36.5 g/dL    RDW 12.5 11.5 - 14.5 %    PLATELET 708 (L) 501 - 400 K/uL    MPV 10.4 8.9 - 12.9 FL    NRBC 0.0 0  WBC    ABSOLUTE NRBC 0.00 0.00 - 0.01 K/uL    NEUTROPHILS 51 32 - 75 %    LYMPHOCYTES 31 12 - 49 %    MONOCYTES 11 5 - 13 %    EOSINOPHILS 6 0 - 7 %    BASOPHILS 1 0 - 1 %    IMMATURE GRANULOCYTES 0 0.0 - 0.5 %    ABS. NEUTROPHILS 2.9 1.8 - 8.0 K/UL    ABS. LYMPHOCYTES 1.8 0.8 - 3.5 K/UL    ABS. MONOCYTES 0.6 0.0 - 1.0 K/UL    ABS. EOSINOPHILS 0.4 0.0 - 0.4 K/UL    ABS. BASOPHILS 0.1 0.0 - 0.1 K/UL    ABS. IMM. GRANS. 0.0 0.00 - 0.04 K/UL    DF AUTOMATED           Assessment / Plan :     Rectal mass, rectal bleeding, lower abdominal pain  - CT with IV contrast 5/24/21 - 3.6 cm annular mass in the mid rectum.  There appears to be transmural spread posterior laterally on the left  - Dr. Yahir Oliver   - patient is agreeable for EGD/colon tomorrow with Dr. Ab Dean   - start bowel prep today at 1500  - clear liquid diet  - NPO at midnight     Decompensated cirrhosis, history of prior alcohol abuse and report of hepatitis C  - CT with IV contrast 5/24/21 - Cirrhosis with portal hypertension and extensive varices including large gastric varices  - Hepatology consult pending     Hypokalemia  - Repletion per hospitalist     Patient C/Renetta Rushing 1106 Problem List:   Active Problems:    Hyperbilirubinemia (5/24/2021)      Anemia (5/24/2021)      Thrombocytopenia ([address] (5/24/2021)      Rectal mass (5/24/2021)      Generalized weakness (5/24/2021)      Scrotal edema (5/24/2021)      LFT elevation (5/24/2021)      Cirrhosis of liver with ascites ([address] (5/24/2021)      Edema of both lower extremities (5/24/2021)

## 2021-05-27 NOTE — PROGRESS NOTES
[address] [facility] Adult  Hospitalist Group                                                                                          Hospitalist Progress Note  Hector Cooley MD  Answering service: [phone] from in house phone        Date of Service:  2021  NAME:  Ling Simons  :  1945  MRN:  [id number]      Admission Summary:   75 yo male admitted for leg pain and abd pain, found to have cirrhosis of the liver with ascites and rectal mass. Work up to include echo, duplex US pending. Hepatologist and CRS consulted. Interval history / Subjective:   2021 :    No distress   Awaits colon /rectal bx for am    On clear liq's celio    No new issues   Else as below        Assessment & Plan:     Cirrhosis of liver with ascites in the setting of alcohol abuse and remote IV drug use. Generalized abdominal pain  Hyperbilirubinemia/transaminitis  Thrombocytopenia  Cirrhosis with portal hypertension and extensive varices including large  gastric varices seen on CT a/p  hepc C ab +  Cont' IV lasix  Monitor LFT  Cont' CIWA, MVI, folic acid, thiamine  Hepatologist/GI consulted           Rectal mass  CT a/p showed 3.6 cm annular mass in the mid rectum. Colorectal surgeon's note is reviewed. Pt states he was un decisive yesterday but is willing to undergo further workup to include biopsy, surgery if that is offered. Will re-consult  GI also consulted, I spoke to Joel  98.. - colon in am     Anemia   Iron profile suggestive of chronic disease. B12 wnl, and folate levels  Daily labs, transfuse for hgb<7  FOBT neg   - f/u iron profile      Generalized weakness   Required assistance to stand; place on fall precautions  Refused PT/OT, will need to re-consult if pt in agreement.     Pulmonary edema  Anasarca   Former used diuretics but patient stopped due to \"diarrhea\".    CXR showed interstitial pulmonary edema or chronic interstitial lung disease more likely  than interstitial pneumonitis given the clinical history. Nonspecific sclerosis in the posterior right fourth rib. Consider non emergent bone scan  Probnp 744  B/l duplex US neg for DVT, Echo showed nl EF   Cont' IV lasix BID  Keep legs elevated at rest, daily wt, strict I&O  Monitor fluid status with strict Is and Os / daily weights  - no s/s of pul edema 5/27/2021          Hypokalemia, K 2.9, pt refusing PO KCl, will replete with IV KCl, check mag  - 3.0 5/27/2021 for iv replacement 5/27/2021 , mag in am     Tobacco abuse  Encourage smoking cessation  Nicotine patch     VTE prophylaxis:  SCDs to BLEs     ADVANCED DIRECTIVE/ CODE STATUS:  DO NOT RESUSCITATE (DNR) as discussed with both patient and his power of . Patient signed durable DNR form. Hospital Problems  Never Reviewed        Codes Class Noted POA    Hyperbilirubinemia ICD-10-CM: E80.6  ICD-9-CM: 782.4  5/24/2021 Unknown        Anemia ICD-10-CM: D64.9  ICD-9-CM: 285.9  5/24/2021 Unknown        Thrombocytopenia ([facility] [address] ICD-10-CM: D69.6  ICD-9-CM: 287.5  5/24/2021 Unknown        Rectal mass ICD-10-CM: K62.89  ICD-9-CM: 787.99  5/24/2021 Unknown        Generalized weakness ICD-10-CM: R53.1  ICD-9-CM: 780.79  5/24/2021 Unknown        Scrotal edema ICD-10-CM: N50.89  ICD-9-CM: 608.86  5/24/2021 Unknown        LFT elevation ICD-10-CM: R79.89  ICD-9-CM: 790.6  5/24/2021 Unknown        [facility] ICD-10-CM: K74.60, R18.8  ICD-9-CM: 571.5  5/24/2021 Unknown        Edema of both lower extremities ICD-10-CM: R60.0  ICD-9-CM: 782.3  5/24/2021 Unknown                Review of Systems:   A comprehensive review of systems was negative except for that written in the HPI. Vital Signs:    Last 24hrs VS reviewed since prior progress note.  Most recent are:  Visit Vitals  /65 (BP 1 Location: Left upper arm, BP Patient Position: At rest)   Pulse 65   Temp 97.5 °F (36.4 °C)   Resp 18   Ht 6' (1.829 m)   Wt 80.6 kg (177 lb 11.1 oz)   SpO2 96%   BMI 24.10 kg/m²       No intake or output data in the 24 hours ending 05/27/21 9648     Physical Examination:     I had a face to face encounter with this patient and independently examined them on 5/27/2021 as outlined below:          Constitutional:  No acute distress, cooperative, pleasant    ENT:  Oral mucosa moist, oropharynx benign. Resp:  CTA bilaterally. No wheezing/rhonchi/rales. No accessory muscle use   CV:  Regular rhythm, normal rate, no murmurs, gallops, rubs    GI:  Soft, non distended, non tender. normoactive bowel sounds, no hepatosplenomegaly     Musculoskeletal:  b/l LE edema up to thighs, + scrotal edema, warm, 2+ pulses throughout    Neurologic:  Moves all extremities. AAOx3, CN II-XII reviewed            Data Review:    Review and/or order of clinical lab test      Labs:     Recent Labs     05/27/21 0303 05/26/21 0152   WBC 5.7 5.6   HGB 11.0* 10.8*   HCT 32.5* 31.8*   * 117*     Recent Labs     05/27/21  0303 05/26/21  0152 05/25/21  0330    139 138   K 3.0* 2.9* 3.3*    106 110*   CO2 27 26 22   BUN 8 7 7   CREA 0.70 0.63* 0.60*   GLU 78 83 98   CA 8.1* 7.8* 8.0*   MG 1.8  --  1.9     Recent Labs     05/27/21  0303 05/26/21  0152 05/25/21  0330 05/24/21  1246   ALT 46 46 42 49    100 104 145*   TBILI 1.2* 1.3* 1.4* 1.3*   TP 5.4* 5.5* 5.4* 6.2*   ALB 2.1* 2.0* 2.0* 2.4*   GLOB 3.3 3.5 3.4 3.8   LPSE  --   --   --  168     Recent Labs     05/25/21  0330   INR 1.3*   PTP 13.3*   APTT 32.5*      Recent Labs     05/25/21  0330   TIBC 240*   PSAT 80*   FERR 656*      Lab Results   Component Value Date/Time    Folate 7.6 05/25/2021 03:30 AM      No results for input(s): PH, PCO2, PO2 in the last 72 hours.   Recent Labs     05/24/21  1246   TROIQ <0.05     No results found for: CHOL, CHOLX, CHLST, CHOLV, HDL, HDLP, LDL, LDLC, DLDLP, TGLX, TRIGL, TRIGP, CHHD, CHHDX  No results found for: [facility]  Lab Results   Component Value Date/Time    Color YELLOW/STRAW 05/24/2021 06:09 PM Appearance CLEAR 05/24/2021 06:09 PM    Specific gravity 1.010 05/24/2021 06:09 PM    pH (UA) 7.5 05/24/2021 06:09 PM    Protein Negative 05/24/2021 06:09 PM    Glucose Negative 05/24/2021 06:09 PM    Ketone Negative 05/24/2021 06:09 PM    Bilirubin Negative 05/24/2021 06:09 PM    Urobilinogen 1.0 05/24/2021 06:09 PM    Nitrites Negative 05/24/2021 06:09 PM    Leukocyte Esterase Negative 05/24/2021 06:09 PM         Medications Reviewed:     Current Facility-Administered Medications   Medication Dose Route Frequency    peg 3350-Electrolytes-Vit C (MOVIPREP) oral solution 2 L  2 L Oral ONCE    nicotine (NICODERM CQ) 21 mg/24 hr patch 1 Patch  1 Patch TransDERmal DAILY    furosemide (LASIX) injection 40 mg  40 mg IntraVENous BID    hydrALAZINE (APRESOLINE) 20 mg/mL injection 10 mg  10 mg IntraVENous Q6H PRN    traMADoL (ULTRAM) tablet 50 mg  50 mg Oral Q6H PRN    LORazepam (ATIVAN) tablet 1 mg  1 mg Oral Q1H PRN    LORazepam (ATIVAN) tablet 2 mg  2 mg Oral R0B PRN    folic acid (FOLVITE) tablet 1 mg  1 mg Oral DAILY    thiamine HCL (B-1) tablet 100 mg  100 mg Oral DAILY    multivitamin, tx-iron-ca-min (THERA-M w/ IRON) tablet 1 Tablet  1 Tablet Oral DAILY    sodium chloride (NS) flush 5-40 mL  5-40 mL IntraVENous Q8H    sodium chloride (NS) flush 5-40 mL  5-40 mL IntraVENous PRN    polyethylene glycol (MIRALAX) packet 17 g  17 g Oral DAILY PRN    ondansetron (ZOFRAN) injection 4 mg  4 mg IntraVENous Q6H PRN     ______________________________________________________________________  EXPECTED LENGTH OF STAY: 3-4 days  ACTUAL LENGTH OF STAY:          3                 Hector Cooley MD

## 2021-05-27 NOTE — PROGRESS NOTES
Transition of Care Plan   RUR- 12%   DISPOSITION: The disposition plan is home with family assistance/; pending medical progression   F/U with PCP/Specialist     Transport: TBD   The pt is being followed by GI, CRS,    Per Colon and rectal surgery the pt appears to have a rectal mass; suspicious of cancer. The pt to have a EGD and Colonoscopy Friday to further examine recent findings. CRS to reevaluate the pt following EGD and Colonoscopy. This cm notes that the pt does not have any skilled needs at this time.      CM: Karina Schulz [place]. MSW,   [phone]

## 2021-05-28 ENCOUNTER — ANESTHESIA EVENT (OUTPATIENT)
Dept: ENDOSCOPY | Age: 76
DRG: 432 | End: 2021-05-28
Payer: MEDICARE

## 2021-05-28 ENCOUNTER — ANESTHESIA (OUTPATIENT)
Dept: ENDOSCOPY | Age: 76
DRG: 432 | End: 2021-05-28
Payer: MEDICARE

## 2021-05-28 LAB
ALBUMIN SERPL-MCNC: 2.7 G/DL (ref 3.5–5)
ALBUMIN/GLOB SERPL: 0.8 {RATIO} (ref 1.1–2.2)
ALP SERPL-CCNC: 96 U/L (ref 45–117)
ALT SERPL-CCNC: 50 U/L (ref 12–78)
ANION GAP SERPL CALC-SCNC: 9 MMOL/L (ref 5–15)
AST SERPL-CCNC: 91 U/L (ref 15–37)
BILIRUB SERPL-MCNC: 1.3 MG/DL (ref 0.2–1)
BUN SERPL-MCNC: 8 MG/DL (ref 6–20)
BUN/CREAT SERPL: 11 (ref 12–20)
CALCIUM SERPL-MCNC: 8.2 MG/DL (ref 8.5–10.1)
CHLORIDE SERPL-SCNC: 105 MMOL/L (ref 97–108)
CO2 SERPL-SCNC: 26 MMOL/L (ref 21–32)
CREAT SERPL-MCNC: 0.76 MG/DL (ref 0.7–1.3)
GLOBULIN SER CALC-MCNC: 3.6 G/DL (ref 2–4)
GLUCOSE SERPL-MCNC: 104 MG/DL (ref 65–100)
IRON SATN MFR SERPL: 72 % (ref 20–50)
IRON SERPL-MCNC: 159 UG/DL (ref 35–150)
MAGNESIUM SERPL-MCNC: 1.8 MG/DL (ref 1.6–2.4)
POTASSIUM SERPL-SCNC: 3.2 MMOL/L (ref 3.5–5.1)
PROT SERPL-MCNC: 6.3 G/DL (ref 6.4–8.2)
SODIUM SERPL-SCNC: 140 MMOL/L (ref 136–145)
TIBC SERPL-MCNC: 221 UG/DL (ref 250–450)

## 2021-05-28 PROCEDURE — 74011250637 HC RX REV CODE- 250/637: Performed by: INTERNAL MEDICINE

## 2021-05-28 PROCEDURE — 77030013992 HC SNR POLYP ENDOSC BSC -B: Performed by: INTERNAL MEDICINE

## 2021-05-28 PROCEDURE — 77030040684 HC NDL ENDOSC NEEDLEMASTR OCOA -B: Performed by: INTERNAL MEDICINE

## 2021-05-28 PROCEDURE — 83540 ASSAY OF IRON: CPT

## 2021-05-28 PROCEDURE — 0DBL8ZX EXCISION OF TRANSVERSE COLON, VIA NATURAL OR ARTIFICIAL OPENING ENDOSCOPIC, DIAGNOSTIC: ICD-10-PCS | Performed by: INTERNAL MEDICINE

## 2021-05-28 PROCEDURE — P9047 ALBUMIN (HUMAN), 25%, 50ML: HCPCS | Performed by: HOSPITALIST

## 2021-05-28 PROCEDURE — 0DJ08ZZ INSPECTION OF UPPER INTESTINAL TRACT, VIA NATURAL OR ARTIFICIAL OPENING ENDOSCOPIC: ICD-10-PCS | Performed by: INTERNAL MEDICINE

## 2021-05-28 PROCEDURE — 76040000007: Performed by: INTERNAL MEDICINE

## 2021-05-28 PROCEDURE — 65270000029 HC RM PRIVATE

## 2021-05-28 PROCEDURE — 0DBP8ZX EXCISION OF RECTUM, VIA NATURAL OR ARTIFICIAL OPENING ENDOSCOPIC, DIAGNOSTIC: ICD-10-PCS | Performed by: INTERNAL MEDICINE

## 2021-05-28 PROCEDURE — 76060000032 HC ANESTHESIA 0.5 TO 1 HR: Performed by: INTERNAL MEDICINE

## 2021-05-28 PROCEDURE — 88305 TISSUE EXAM BY PATHOLOGIST: CPT

## 2021-05-28 PROCEDURE — 74011250637 HC RX REV CODE- 250/637: Performed by: FAMILY MEDICINE

## 2021-05-28 PROCEDURE — 77030021593 HC FCPS BIOP ENDOSC BSC -A: Performed by: INTERNAL MEDICINE

## 2021-05-28 PROCEDURE — 74011250636 HC RX REV CODE- 250/636: Performed by: HOSPITALIST

## 2021-05-28 PROCEDURE — 74011000250 HC RX REV CODE- 250: Performed by: NURSE ANESTHETIST, CERTIFIED REGISTERED

## 2021-05-28 PROCEDURE — 83735 ASSAY OF MAGNESIUM: CPT

## 2021-05-28 PROCEDURE — 80053 COMPREHEN METABOLIC PANEL: CPT

## 2021-05-28 PROCEDURE — 74011250636 HC RX REV CODE- 250/636: Performed by: NURSE ANESTHETIST, CERTIFIED REGISTERED

## 2021-05-28 PROCEDURE — 36415 COLL VENOUS BLD VENIPUNCTURE: CPT

## 2021-05-28 PROCEDURE — 2709999900 HC NON-CHARGEABLE SUPPLY: Performed by: INTERNAL MEDICINE

## 2021-05-28 PROCEDURE — 74011250636 HC RX REV CODE- 250/636: Performed by: INTERNAL MEDICINE

## 2021-05-28 PROCEDURE — 0DBK8ZX EXCISION OF ASCENDING COLON, VIA NATURAL OR ARTIFICIAL OPENING ENDOSCOPIC, DIAGNOSTIC: ICD-10-PCS | Performed by: INTERNAL MEDICINE

## 2021-05-28 RX ORDER — NALOXONE HYDROCHLORIDE 0.4 MG/ML
0.4 INJECTION, SOLUTION INTRAMUSCULAR; INTRAVENOUS; SUBCUTANEOUS
Status: DISCONTINUED | OUTPATIENT
Start: 2021-05-28 | End: 2021-05-28 | Stop reason: HOSPADM

## 2021-05-28 RX ORDER — FENTANYL CITRATE 50 UG/ML
100 INJECTION, SOLUTION INTRAMUSCULAR; INTRAVENOUS
Status: DISCONTINUED | OUTPATIENT
Start: 2021-05-28 | End: 2021-05-28 | Stop reason: HOSPADM

## 2021-05-28 RX ORDER — POTASSIUM CHLORIDE 750 MG/1
30 TABLET, FILM COATED, EXTENDED RELEASE ORAL
Status: COMPLETED | OUTPATIENT
Start: 2021-05-28 | End: 2021-05-28

## 2021-05-28 RX ORDER — PROPOFOL 10 MG/ML
INJECTION, EMULSION INTRAVENOUS AS NEEDED
Status: DISCONTINUED | OUTPATIENT
Start: 2021-05-28 | End: 2021-05-28 | Stop reason: HOSPADM

## 2021-05-28 RX ORDER — POLYETHYLENE GLYCOL 3350 17 G/17G
17 POWDER, FOR SOLUTION ORAL
Status: DISCONTINUED | OUTPATIENT
Start: 2021-05-28 | End: 2021-05-29 | Stop reason: HOSPADM

## 2021-05-28 RX ORDER — EPINEPHRINE 0.1 MG/ML
1 INJECTION INTRACARDIAC; INTRAVENOUS
Status: DISCONTINUED | OUTPATIENT
Start: 2021-05-28 | End: 2021-05-28 | Stop reason: HOSPADM

## 2021-05-28 RX ORDER — DEXTROMETHORPHAN/PSEUDOEPHED 2.5-7.5/.8
1.2 DROPS ORAL
Status: DISCONTINUED | OUTPATIENT
Start: 2021-05-28 | End: 2021-05-28 | Stop reason: HOSPADM

## 2021-05-28 RX ORDER — SODIUM CHLORIDE 0.9 % (FLUSH) 0.9 %
5-40 SYRINGE (ML) INJECTION EVERY 8 HOURS
Status: DISCONTINUED | OUTPATIENT
Start: 2021-05-28 | End: 2021-05-29 | Stop reason: HOSPADM

## 2021-05-28 RX ORDER — POTASSIUM CHLORIDE 7.45 MG/ML
10 INJECTION INTRAVENOUS
Status: DISPENSED | OUTPATIENT
Start: 2021-05-28 | End: 2021-05-28

## 2021-05-28 RX ORDER — SODIUM CHLORIDE 9 MG/ML
INJECTION, SOLUTION INTRAVENOUS
Status: DISCONTINUED | OUTPATIENT
Start: 2021-05-28 | End: 2021-05-28 | Stop reason: HOSPADM

## 2021-05-28 RX ORDER — MIDAZOLAM HYDROCHLORIDE 1 MG/ML
.25-1 INJECTION, SOLUTION INTRAMUSCULAR; INTRAVENOUS
Status: DISCONTINUED | OUTPATIENT
Start: 2021-05-28 | End: 2021-05-28 | Stop reason: HOSPADM

## 2021-05-28 RX ORDER — SODIUM CHLORIDE 0.9 % (FLUSH) 0.9 %
5-40 SYRINGE (ML) INJECTION AS NEEDED
Status: DISCONTINUED | OUTPATIENT
Start: 2021-05-28 | End: 2021-05-29 | Stop reason: HOSPADM

## 2021-05-28 RX ORDER — ATROPINE SULFATE 0.1 MG/ML
0.5 INJECTION INTRAVENOUS
Status: DISCONTINUED | OUTPATIENT
Start: 2021-05-28 | End: 2021-05-28 | Stop reason: HOSPADM

## 2021-05-28 RX ORDER — LIDOCAINE HYDROCHLORIDE 20 MG/ML
INJECTION, SOLUTION EPIDURAL; INFILTRATION; INTRACAUDAL; PERINEURAL AS NEEDED
Status: DISCONTINUED | OUTPATIENT
Start: 2021-05-28 | End: 2021-05-28 | Stop reason: HOSPADM

## 2021-05-28 RX ORDER — SODIUM CHLORIDE 9 MG/ML
50 INJECTION, SOLUTION INTRAVENOUS CONTINUOUS
Status: DISPENSED | OUTPATIENT
Start: 2021-05-28 | End: 2021-05-28

## 2021-05-28 RX ORDER — FLUMAZENIL 0.1 MG/ML
0.2 INJECTION INTRAVENOUS
Status: DISCONTINUED | OUTPATIENT
Start: 2021-05-28 | End: 2021-05-28 | Stop reason: HOSPADM

## 2021-05-28 RX ADMIN — PROPOFOL 50 MG: 10 INJECTION, EMULSION INTRAVENOUS at 10:09

## 2021-05-28 RX ADMIN — FUROSEMIDE 40 MG: 10 INJECTION, SOLUTION INTRAMUSCULAR; INTRAVENOUS at 17:43

## 2021-05-28 RX ADMIN — ALBUMIN (HUMAN) 25 G: 0.25 INJECTION, SOLUTION INTRAVENOUS at 06:05

## 2021-05-28 RX ADMIN — PROPOFOL 50 MG: 10 INJECTION, EMULSION INTRAVENOUS at 10:04

## 2021-05-28 RX ADMIN — LIDOCAINE HYDROCHLORIDE 80 MG: 20 INJECTION, SOLUTION EPIDURAL; INFILTRATION; INTRACAUDAL; PERINEURAL at 09:38

## 2021-05-28 RX ADMIN — Medication 10 ML: at 13:33

## 2021-05-28 RX ADMIN — PROPOFOL 50 MG: 10 INJECTION, EMULSION INTRAVENOUS at 10:20

## 2021-05-28 RX ADMIN — PROPOFOL 50 MG: 10 INJECTION, EMULSION INTRAVENOUS at 10:13

## 2021-05-28 RX ADMIN — Medication 1 TABLET: at 11:42

## 2021-05-28 RX ADMIN — PROPOFOL 50 MG: 10 INJECTION, EMULSION INTRAVENOUS at 09:53

## 2021-05-28 RX ADMIN — Medication 10 ML: at 21:21

## 2021-05-28 RX ADMIN — PROPOFOL 50 MG: 10 INJECTION, EMULSION INTRAVENOUS at 10:06

## 2021-05-28 RX ADMIN — Medication 100 MG: at 11:42

## 2021-05-28 RX ADMIN — ALBUMIN (HUMAN) 25 G: 0.25 INJECTION, SOLUTION INTRAVENOUS at 11:43

## 2021-05-28 RX ADMIN — Medication 10 ML: at 13:34

## 2021-05-28 RX ADMIN — Medication 10 ML: at 06:09

## 2021-05-28 RX ADMIN — PROPOFOL 50 MG: 10 INJECTION, EMULSION INTRAVENOUS at 09:49

## 2021-05-28 RX ADMIN — PROPOFOL 50 MG: 10 INJECTION, EMULSION INTRAVENOUS at 10:17

## 2021-05-28 RX ADMIN — Medication 10 ML: at 00:12

## 2021-05-28 RX ADMIN — FUROSEMIDE 40 MG: 10 INJECTION, SOLUTION INTRAMUSCULAR; INTRAVENOUS at 11:41

## 2021-05-28 RX ADMIN — PROPOFOL 50 MG: 10 INJECTION, EMULSION INTRAVENOUS at 10:00

## 2021-05-28 RX ADMIN — PROPOFOL 50 MG: 10 INJECTION, EMULSION INTRAVENOUS at 09:41

## 2021-05-28 RX ADMIN — PROPOFOL 50 MG: 10 INJECTION, EMULSION INTRAVENOUS at 09:57

## 2021-05-28 RX ADMIN — POTASSIUM CHLORIDE 30 MEQ: 750 TABLET, EXTENDED RELEASE ORAL at 13:33

## 2021-05-28 RX ADMIN — PROPOFOL 50 MG: 10 INJECTION, EMULSION INTRAVENOUS at 09:44

## 2021-05-28 RX ADMIN — SODIUM CHLORIDE: 900 INJECTION, SOLUTION INTRAVENOUS at 09:30

## 2021-05-28 RX ADMIN — PROPOFOL 50 MG: 10 INJECTION, EMULSION INTRAVENOUS at 10:24

## 2021-05-28 RX ADMIN — ALBUMIN (HUMAN) 25 G: 0.25 INJECTION, SOLUTION INTRAVENOUS at 17:43

## 2021-05-28 RX ADMIN — Medication 10 ML: at 21:22

## 2021-05-28 RX ADMIN — PROPOFOL 50 MG: 10 INJECTION, EMULSION INTRAVENOUS at 10:11

## 2021-05-28 RX ADMIN — ALBUMIN (HUMAN) 25 G: 0.25 INJECTION, SOLUTION INTRAVENOUS at 00:12

## 2021-05-28 RX ADMIN — PROPOFOL 100 MG: 10 INJECTION, EMULSION INTRAVENOUS at 09:38

## 2021-05-28 RX ADMIN — FOLIC ACID 1 MG: 1 TABLET ORAL at 11:42

## 2021-05-28 NOTE — PROGRESS NOTES
[facility] Adult  Hospitalist Group                                                                                          Hospitalist Progress Note  Yolanda Trent MD  Answering service: [phone] OR [phone] from in house phone        Date of Service:  2021  NAME:  Cindy Choi  :  1945  MRN:  [id number]      Admission Summary:   75 yo male admitted for leg pain and abd pain, found to have cirrhosis of the liver with ascites and rectal mass. Work up to include echo, duplex US pending. Hepatologist and CRS consulted. Interval history / Subjective:   2021 :    No distress   For colon today   Lori Sack in room; pt allows full medical info given to same         Assessment & Plan:     Cirrhosis of liver with ascites in the setting of alcohol abuse and remote IV drug use. Generalized abdominal pain  Hyperbilirubinemia/transaminitis  Thrombocytopenia  Cirrhosis with portal hypertension and extensive varices including large  gastric varices seen on CT a/p  hepc C ab +  Cont' IV lasix  Monitor LFT  Cont' CIWA, MVI, folic acid, thiamine  Hepatologist/GI consulted  - no s/s of w/d  - 2021            Rectal mass  CT a/p showed 3.6 cm annular mass in the mid rectum. Colorectal surgeon's note is reviewed. Pt states he was un decisive yesterday but is willing to undergo further workup to include biopsy, surgery if that is offered. Will re-consult  GI also consulted, I spoke to Joel  98.. - colon in am - pending this am  2021     Anemia   Iron profile suggestive of chronic disease. B12 wnl, and folate levels  Daily labs, transfuse for hgb<7  FOBT neg   - f/u iron profile   - adequet iron stores.      Generalized weakness   Required assistance to stand; place on fall precautions  Refused PT/OT, will need to re-consult if pt in agreement.     Pulmonary edema  Anasarca   Former used diuretics but patient stopped due to \"diarrhea\".    CXR showed interstitial pulmonary Please remember to call and schedule a follow up appointment if one was recommended at your earliest convenience.  Orthopedics CLINIC HOURS TELEPHONE NUMBER   Dr. Sigrid Marquez  Certified Medical Assistant   Monday & Wednesday   8am - 5pm  Thursday 1pm - 5pm  Friday 8am -11:30am Specialty schedulers:   [phone] to schedule your surgery.  Main Clinic:   [phone] to make an appointment with any provider.    Urgent Care locations:    [facility] Monday-Friday Closed  Saturday-Sunday 9am-5pm      Monday-Friday 12pm - 8pm  Saturday-Sunday 9am-5pm [phone]     If SURGERY has been recommended, please call our Specialty Schedulers at [phone] to schedule your procedure.    If you need a medication refill, please contact your pharmacy. Please allow 3 business days for your refill to be completed.    If an MRI or CT scan has been recommended, please call [facility] Schedulers at [phone] to schedule your appointment.  Use [employer] (secure e-mail communication and access to your chart) to send a message or to make an appointment. Please ask how you can sign up for [employer].  Your care team's suggested websites for health information:   [email or web address] : Up to date and easily searchable information on multiple topics.   [email or web address] sale to [email or web address] : [place] dept of heat, public health issues in [place], N1N1       edema or chronic interstitial lung disease more likely  than interstitial pneumonitis given the clinical history. Nonspecific sclerosis in the posterior right fourth rib. Consider non emergent bone scan  Probnp 744  B/l duplex US neg for DVT, Echo showed nl EF   Cont' IV lasix BID  Keep legs elevated at rest, daily wt, strict I&O  Monitor fluid status with strict Is and Os / daily weights  - no s/s of pul edema 5/27/2021          Hypokalemia, K 2.9, pt refusing PO KCl, will replete with IV KCl, check mag  - 3.0 5/27/2021 for iv replacement 5/27/2021 , mag in am - 3.2 5/28/2021  More iv replacement     Tobacco abuse  Encourage smoking cessation  Nicotine patch     VTE prophylaxis:  SCDs to BLEs     ADVANCED DIRECTIVE/ CODE STATUS:  DO NOT RESUSCITATE (DNR) as discussed with both patient and his power of . Patient signed durable DNR form. Hospital Problems  Never Reviewed        Codes Class Noted POA    Hyperbilirubinemia ICD-10-CM: E80.6  ICD-9-CM: 782.4  5/24/2021 Unknown        Anemia ICD-10-CM: D64.9  ICD-9-CM: 285.9  5/24/2021 Unknown        Thrombocytopenia ([facility] ICD-10-CM: D69.6  ICD-9-CM: 287.5  5/24/2021 Unknown        Rectal mass ICD-10-CM: K62.89  ICD-9-CM: 787.99  5/24/2021 Unknown        Generalized weakness ICD-10-CM: R53.1  ICD-9-CM: 780.79  5/24/2021 Unknown        Scrotal edema ICD-10-CM: N50.89  ICD-9-CM: 608.86  5/24/2021 Unknown        LFT elevation ICD-10-CM: R79.89  ICD-9-CM: 790.6  5/24/2021 Unknown        Cir[facility] of liver with ascites ([facility] ICD-10-CM: K74.60, R18.8  ICD-9-CM: 571.5  5/24/2021 Unknown        Edema of both lower extremities ICD-10-CM: R60.0  ICD-9-CM: 782.3  5/24/2021 Unknown                Review of Systems:   A comprehensive review of systems was negative except for that written in the HPI. Vital Signs:    Last 24hrs VS reviewed since prior progress note. Most recent are:  Visit Vitals  BP (!) 131/58 (BP 1 Location: Left upper arm, BP Patient Position:  At rest)   Pulse 69   Temp 97.2 °F (36.2 °C)   Resp 18   Ht 6' (1.829 m)   Wt 71.7 kg (158 lb 1.6 oz)   SpO2 95%   BMI 21.44 kg/m²       No intake or output data in the 24 hours ending 05/28/21 0900     Physical Examination:     I had a face to face encounter with this patient and independently examined them on 5/28/2021 as outlined below:          Constitutional:  No acute distress, cooperative, pleasant    ENT:  Oral mucosa moist, oropharynx benign. Resp:  CTA bilaterally. No wheezing/rhonchi/rales. No accessory muscle use   CV:  Regular rhythm, normal rate, no murmurs, gallops, rubs    GI:  Soft, non distended, non tender. normoactive bowel sounds, no hepatosplenomegaly     Musculoskeletal:  b/l LE edema up to thighs, + scrotal edema, warm, 2+ pulses throughout    Neurologic:  Moves all extremities. AAOx3, CN II-XII reviewed            Data Review:    Review and/or order of clinical lab test      Labs:     Recent Labs     05/27/21 0303 05/26/21 0152   WBC 5.7 5.6   HGB 11.0* 10.8*   HCT 32.5* 31.8*   * 117*     Recent Labs     05/28/21 0027 05/27/21 0303 05/26/21  0152    138 139   K 3.2* 3.0* 2.9*    104 106   CO2 26 27 26   BUN 8 8 7   CREA 0.76 0.70 0.63*   * 78 83   CA 8.2* 8.1* 7.8*   MG 1.8 1.8  --      Recent Labs     05/28/21 0027 05/27/21 0303 05/26/21  0152   ALT 50 46 46   AP 96 115 100   TBILI 1.3* 1.2* 1.3*   TP 6.3* 5.4* 5.5*   ALB 2.7* 2.1* 2.0*   GLOB 3.6 3.3 3.5     No results for input(s): INR, PTP, APTT, INREXT, INREXT in the last 72 hours. Recent Labs     05/28/21 0027   TIBC 221*   PSAT 72*      Lab Results   Component Value Date/Time    Folate 7.6 05/25/2021 03:30 AM      No results for input(s): PH, PCO2, PO2 in the last 72 hours. No results for input(s): CPK, CKNDX, TROIQ in the last 72 hours.     No lab exists for component: CPKMB  No results found for: CHOL, CHOLX, CHLST, CHOLV, HDL, HDLP, LDL, LDLC, DLDLP, TGLX, TRIGL, TRIGP, CHHD, CHHDX  No results found for: [facility]  Lab Results   Component Value Date/Time    Color YELLOW/STRAW 05/24/2021 06:09 PM    Appearance CLEAR 05/24/2021 06:09 PM    Specific gravity 1.010 05/24/2021 06:09 PM    pH (UA) 7.5 05/24/2021 06:09 PM    Protein Negative 05/24/2021 06:09 PM    Glucose Negative 05/24/2021 06:09 PM    Ketone Negative 05/24/2021 06:09 PM    Bilirubin Negative 05/24/2021 06:09 PM    Urobilinogen 1.0 05/24/2021 06:09 PM    Nitrites Negative 05/24/2021 06:09 PM    Leukocyte Esterase Negative 05/24/2021 06:09 PM         Medications Reviewed:     Current Facility-Administered Medications   Medication Dose Route Frequency    potassium chloride 10 mEq in 100 ml IVPB  10 mEq IntraVENous Q1H    albumin human 25% (BUMINATE) solution 25 g  25 g IntraVENous Q6H    nicotine (NICODERM CQ) 21 mg/24 hr patch 1 Patch  1 Patch TransDERmal DAILY    furosemide (LASIX) injection 40 mg  40 mg IntraVENous BID    hydrALAZINE (APRESOLINE) 20 mg/mL injection 10 mg  10 mg IntraVENous Q6H PRN    traMADoL (ULTRAM) tablet 50 mg  50 mg Oral Q6H PRN    LORazepam (ATIVAN) tablet 1 mg  1 mg Oral Q1H PRN    LORazepam (ATIVAN) tablet 2 mg  2 mg Oral O4A PRN    folic acid (FOLVITE) tablet 1 mg  1 mg Oral DAILY    thiamine HCL (B-1) tablet 100 mg  100 mg Oral DAILY    multivitamin, tx-iron-ca-min (THERA-M w/ IRON) tablet 1 Tablet  1 Tablet Oral DAILY    sodium chloride (NS) flush 5-40 mL  5-40 mL IntraVENous Q8H    sodium chloride (NS) flush 5-40 mL  5-40 mL IntraVENous PRN    polyethylene glycol (MIRALAX) packet 17 g  17 g Oral DAILY PRN    ondansetron (ZOFRAN) injection 4 mg  4 mg IntraVENous Q6H PRN     ______________________________________________________________________  EXPECTED LENGTH OF STAY: 3-4 days  ACTUAL LENGTH OF STAY:          4                 Fabricio Arambula MD

## 2021-05-28 NOTE — PROGRESS NOTES
General Daily Progress Note    Admission Date: 5/24/2021  Hospital Day 5  Post-Op Day Not Applicable  Subjective:   Hated the bowel prep yesterday, but tolerated it and completed it. Unhappy about still being on a clear liquid diet (ordered by Dr. Michaela Ott). Urinating a lot. Still having abdominal pain. Objective:     Patient Vitals for the past 24 hrs:   BP Temp Pulse Resp SpO2 Weight   05/28/21 1501 (!) 148/68 97.7 °F (36.5 °C) 77 17 99 %    05/28/21 1356 130/67 97.5 °F (36.4 °C) 66 18 99 %    05/28/21 1232 (!) 148/68 97.6 °F (36.4 °C) 62 19 98 %    05/28/21 1147 (!) 159/74 97.6 °F (36.4 °C) (!) 57 18 98 %    05/28/21 1100 (!) 144/66  (!) 59 18 100 %    05/28/21 1055 138/67  63 16 100 %    05/28/21 1044 (!) 143/75  (!) 58 16 100 %    05/28/21 1027 100/65  63 24 96 %    05/28/21 0852 (!) 131/58 97.2 °F (36.2 °C)  18 95 %    05/28/21 0719      71.7 kg (158 lb 1.6 oz)   05/28/21 0451 (!) 131/54 97.7 °F (36.5 °C) 69 18 97 %    05/27/21 2001 (!) 164/77 98.1 °F (36.7 °C) 71 18 99 %      05/28 0701 - 05/28 1900  In: 710 [P.O.:360; I.V.:350]  Out: -   No intake/output data recorded. PHYSICAL EXAMINATION:    GENERAL:  No distress. EXTREMITIES:  Bilateral lower extremity edema. NEURO:  Alert and oriented. Grumpy.             Data Review   Recent Results (from the past 24 hour(s))   MAGNESIUM    Collection Time: 05/28/21 12:27 AM   Result Value Ref Range    Magnesium 1.8 1.6 - 2.4 mg/dL   METABOLIC PANEL, COMPREHENSIVE    Collection Time: 05/28/21 12:27 AM   Result Value Ref Range    Sodium 140 136 - 145 mmol/L    Potassium 3.2 (L) 3.5 - 5.1 mmol/L    Chloride 105 97 - 108 mmol/L    CO2 26 21 - 32 mmol/L    Anion gap 9 5 - 15 mmol/L    Glucose 104 (H) 65 - 100 mg/dL    BUN 8 6 - 20 MG/DL    Creatinine 0.76 0.70 - 1.30 MG/DL    BUN/Creatinine ratio 11 (L) 12 - 20      GFR est AA >60 >60 ml/min/1.73m2    GFR est non-AA >60 >60 ml/min/1.73m2    Calcium 8.2 (L) 8.5 - 10.1 MG/DL    Bilirubin, total 1.3 (H) 0.2 - 1.0 MG/DL    ALT (SGPT) 50 12 - 78 U/L    AST (SGOT) 91 (H) 15 - 37 U/L    Alk. phosphatase 96 45 - 117 U/L    Protein, total 6.3 (L) 6.4 - 8.2 g/dL    Albumin 2.7 (L) 3.5 - 5.0 g/dL    Globulin 3.6 2.0 - 4.0 g/dL    A-G Ratio 0.8 (L) 1.1 - 2.2     IRON PROFILE    Collection Time: 05/28/21 12:27 AM   Result Value Ref Range    Iron 159 (H) 35 - 150 ug/dL    TIBC 221 (L) 250 - 450 ug/dL    Iron % saturation 72 (H) 20 - 50 %           Assessment and Plan: The biopsy results are of course pending, but the rectal mass, which I palpated in Endoscopy this morning during the colonoscopy performed by Dr. Ariane Elizabeth, is almost certainly adenocarcinoma and the polyps that were removed were almost certainly benign. The luminal narrowing was severe enough to require the use of the ultra-slim   scope; so it will not be possible to perform an endoscopic ultrasound to further characterize the mass. An MRI could be performed, but it probably is not needed. Based upon the CT findings, the endoscopic appearance of the tumor, and the fixation appreciated by palpation, there is virtually no chance that this is a stage I lesion. Stage II and stage III lesions would both normally be treated, in suitable candidates, with neoadjuvant chemoradiation; so we do not really need to differentiate between those two stages to know that if the biopsies confirm the diagnosis of cancer he will need Medical and Radiation Oncology consultations. Unfortunately, the biopsy results are not likely to be available before Tuesday 6/1/2021. Whether or not the patient should remain in the hospital until then will depend on his other medical issues, but if he does not then those consultations should take place in the outpatient setting as soon as possible. Since no other invasive procedures are currently planned, the patient may consume a low fiber diet.   I would also recommend that he take 2 doses of MiraLax per day to reduce his chances of becoming impacted. If he is offered and receives neoadjuvant chemoradiation, then I will want to see him for follow-up and possible surgical planning approximately 6 weeks after the final radiation dose.         Active Problems:    Hyperbilirubinemia (5/24/2021)      Anemia (5/24/2021)      Thrombocytopenia ([address] (5/24/2021)      Rectal mass (5/24/2021)      Generalized weakness (5/24/2021)      Scrotal edema (5/24/2021)      LFT elevation (5/24/2021)      Cirrhosis of liver with ascites ([address] (5/24/2021)      Edema of both lower extremities (5/24/2021)            Plan:

## 2021-05-28 NOTE — PROCEDURES
[address]  [address], [address]  [phone]                           Colonoscopy and EGD Procedure Note      Indications:  Cirrhosis, rectal mass, anemia     :  Gila Perez MD    Staff: Endoscopy Technician-1: Marisa Morley  Endoscopy RN-1: Sil Cheatham RN    Referring Provider: None    Sedation:  MAC anesthesia    Procedure Details:  After informed consent was obtained with all risks and benefits of procedure explained and pre-operative exam completed, pt was placed in the left lateral decubitus position. Following sequential administration of sedation as per above, the gastroscope was inserted into the mouth and advanced under direct vision to second portion of the duodenum. A careful inspection was made as the gastroscope was withdrawn, including a retroflexed view of the proximal stomach; findings and interventions are described below. EGD Findings:  Esophagus:normal esophagus without evidence of esophageal varices. 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 Duodenum/jejunum:normal    EGD Interventions:  none    The bed was then turned and upon sequential sedation as per above, a digital rectal exam was performed per below. The Olympus ultra-slim scope was inserted in the rectum and carefully advanced to the cecum, which was identified by the ileocecal valve and appendiceal orifice. The quality of preparation was fair. The colonoscope was slowly withdrawn with careful evaluation between folds. Retroflexion in the rectum was performed. Colon Findings:   Rectal exam: with full insertion of index finger able to feel very distal aspect of mass  Rectum: small hemorrhoids, circumferential friable 50 mm mass in rectum, starting appx 7 cm from anal verge.  Mass involves majority of lumen, not able to transverse with colonoscope. Transitioned to ultra-slim scope and able to transverse mass, multiple biopsies obtained and distal aspect tattooed  Sigmoid: Moderate diverticulosis   Descending Colon: Moderate diverticulosis   Transverse Colon: Mild diverticulosis, one 6 mm sessile polyp, removed with cold snare  Ascending Colon:  Mild diverticulosis, one 6 mm sessile polyp, removed with cold snare  Cecum: normal    Colonoscopy Interventions:  Polypectomy, biopsies, injection            Specimens Removed:    ID Type Source Tests Collected by Time Destination   1 : ascending colon polyp Preservative Colon, Ascending  Fei Roy MD 5/28/2021 1012 Pathology   2 : transverse colon polyp Preservative Colon, Transverse  Fei Roy MD 5/28/2021 1014 Pathology   3 : rectal mass bx Preservative Rectum  Fei Roy MD 5/28/2021 1023 Pathology       Complications: None. EBL:  Minimal    Impression:    See Postoperative diagnosis above    Recommendations:   - Await pathology. Prep today was adequate to likely identify any lesions > 5 mm. - Colorectal surgery following.      Raina Gaines MD  5/28/2021  10:36 AM

## 2021-05-28 NOTE — PERIOP NOTES
Martha Guevara TRANSFER - IN REPORT:    Verbal report received from dany(name) on [facility]  being received from [facility](unit) for ordered procedure      Report consisted of patients Situation, Background, Assessment and   Recommendations(SBAR). Information from the following report(s) SBAR, Kardex and Procedure Summary was reviewed with the receiving nurse. Opportunity for questions and clarification was provided. 0930: Pt arrives to endo. 0935: . Patient has been evaluated by anesthesia pre-procedure. Patient alert and oriented. Vital signs will not be charted by the Endoscopy nurse. All vitals, airway, and loc are monitored by anesthesia staff throughout procedure. 5273: . Endoscope was pre-cleaned at bedside immediately following procedure by CT      1035. Endoscope was pre-cleaned at bedside immediately following procedure by AB. ..1045: . TRANSFER - OUT REPORT:    Verbal report given to Renae(name) on [facility]  being transferred to [facility](unit) for routine post - op       Report consisted of patients Situation, Background, Assessment and   Recommendations(SBAR). Information from the following report(s) Procedure Summary was reviewed with the receiving nurse.     Lines:   Peripheral IV 05/24/21 Left Antecubital (Active)   Site Assessment Clean, dry, & intact 05/27/21 2248   Phlebitis Assessment 0 05/27/21 2248   Infiltration Assessment 0 05/27/21 2248   Dressing Status Clean, dry, & intact 05/27/21 2248   Dressing Type Tape;Transparent 05/27/21 2248   Hub Color/Line Status Pink;Flushed;Capped 05/27/21 2248   Action Taken Open ports on tubing capped 05/27/21 2248   Alcohol Cap Used Yes 05/27/21 2248       Peripheral IV 05/28/21 Left;Posterior Hand (Active)   Site Assessment Clean, dry, & intact 05/28/21 1002   Phlebitis Assessment 1 05/28/21 1002   Infiltration Assessment 0 05/28/21 1002   Dressing Status Clean, dry, & intact 05/28/21 1002   Dressing Type Transparent 05/28/21 [facility] Color/Line Status Pink 05/28/21 1002   Action Taken Catheter retaped 05/28/21 1002   Alcohol Cap Used Yes 05/28/21 1002        Opportunity for questions and clarification was provided.

## 2021-05-28 NOTE — ANESTHESIA PREPROCEDURE EVALUATION
Relevant Problems   No relevant active problems       Anesthetic History   No history of anesthetic complications            Review of Systems / Medical History  Patient summary reviewed, nursing notes reviewed and pertinent labs reviewed    Pulmonary  Within defined limits                 Neuro/Psych   Within defined limits           Cardiovascular  Within defined limits                Exercise tolerance: >4 METS     GI/Hepatic/Renal  Within defined limits         Liver disease     Endo/Other  Within defined limits      Anemia     Other Findings   Comments: LFT elevation  Cirrhosis of liver with ascites (HCC)  Edema of both lower extremities             Physical Exam    Airway  Mallampati: II  TM Distance: > 6 cm  Neck ROM: normal range of motion   Mouth opening: Normal     Cardiovascular  Regular rate and rhythm,  S1 and S2 normal,  no murmur, click, rub, or gallop             Dental  No notable dental hx       Pulmonary  Breath sounds clear to auscultation               Abdominal  GI exam deferred       Other Findings            Anesthetic Plan    ASA: 3  Anesthesia type: MAC          Induction: Intravenous  Anesthetic plan and risks discussed with: Patient

## 2021-05-28 NOTE — H&P
[address]  [address]  [place], [address]  [phone]                                History and Physical     NAME: Pari Crews   :  1945   MRN:  [id number]     HPI:  The patient was seen and examined. Past Surgical History:   Procedure Laterality Date    HX BACK SURGERY  1978    Lumbar    HX OTHER SURGICAL  1990s    Excision of benign left neck mass.  HX TONSILLECTOMY  circa age 10    HX TONSILLECTOMY  circa age 10    Re-do. Past Medical History:   Diagnosis Date    Alcohol abuse     No alcohol since 2020.  COVID-19 vaccine series started     Pfizer dose #1 received last week. Dose # 2 scheduled for 2021. Social History     Tobacco Use    Smoking status: Not on file   Substance Use Topics    Alcohol use: Not on file    Drug use: Not on file     No Known Allergies  History reviewed. No pertinent family history.   Current Facility-Administered Medications   Medication Dose Route Frequency    potassium chloride 10 mEq in 100 ml IVPB  10 mEq IntraVENous Q1H    albumin human 25% (BUMINATE) solution 25 g  25 g IntraVENous Q6H    nicotine (NICODERM CQ) 21 mg/24 hr patch 1 Patch  1 Patch TransDERmal DAILY    furosemide (LASIX) injection 40 mg  40 mg IntraVENous BID    hydrALAZINE (APRESOLINE) 20 mg/mL injection 10 mg  10 mg IntraVENous Q6H PRN    traMADoL (ULTRAM) tablet 50 mg  50 mg Oral Q6H PRN    LORazepam (ATIVAN) tablet 1 mg  1 mg Oral Q1H PRN    LORazepam (ATIVAN) tablet 2 mg  2 mg Oral W4J PRN    folic acid (FOLVITE) tablet 1 mg  1 mg Oral DAILY    thiamine HCL (B-1) tablet 100 mg  100 mg Oral DAILY    multivitamin, tx-iron-ca-min (THERA-M w/ IRON) tablet 1 Tablet  1 Tablet Oral DAILY    sodium chloride (NS) flush 5-40 mL  5-40 mL IntraVENous Q8H    sodium chloride (NS) flush 5-40 mL  5-40 mL IntraVENous PRN    polyethylene glycol (MIRALAX) packet 17 g  17 g Oral DAILY PRN    ondansetron (ZOFRAN) injection 4 mg  4 mg IntraVENous Q6H PRN         PHYSICAL EXAM:  General: WD, WN. Alert, cooperative, no acute distress    HEENT: NC, Atraumatic. PERRLA, EOMI. Anicteric sclerae. Lungs:  CTA Bilaterally. No Wheezing/Rhonchi/Rales. Heart:  Regular  rhythm,  No murmur, No Rubs, No Gallops  Abdomen: Soft, Non distended, Non tender. +Bowel sounds, no HSM  Extremities: No c/c/e  Neurologic:  CN 2-12 gi, Alert and oriented X 3. No acute neurological distress   Psych:   Good insight. Not anxious nor agitated. The heart, lungs and mental status were satisfactory for the administration of MAC sedation and for the procedure. Mallampati score: 2     The patient was counseled at length about the risks of selena Covid-19 in the tanya-operative and post-operative states including the recovery window of their procedure. The patient was made aware that selena Covid-19 after a surgical procedure may worsen their prognosis for recovering from the virus and lend to a higher morbidity and or mortality risk. The patient was given the options of postponing their procedure. All of the risks, benefits, and alternatives were discussed. The patient does wish to proceed with the procedure.       Assessment:   · Cirrhosis, varices, rectal mass    Plan:   · Endoscopic procedure :egd/colonoscopy  · MAC sedation

## 2021-05-28 NOTE — ANESTHESIA POSTPROCEDURE EVALUATION
Procedure(s):  ESOPHAGOGASTRODUODENOSCOPY (EGD)   :-  COLONOSCOPY  COLON BIOPSY  ENDOSCOPIC POLYPECTOMY  INJECTION. MAC    Anesthesia Post Evaluation        Patient location during evaluation: PACU  Note status: Adequate. Level of consciousness: responsive to verbal stimuli and sleepy but conscious  Pain management: satisfactory to patient  Airway patency: patent  Anesthetic complications: no  Cardiovascular status: acceptable  Respiratory status: acceptable  Hydration status: acceptable  Comments: +Post-Anesthesia Evaluation and Assessment    Patient: Vicente Dye MRN: [id number]  SSN: [id number]   YOB: 1945  Age: 76 y.o. Sex: male          Cardiovascular Function/Vital Signs    BP (!) 148/68 (BP 1 Location: Left upper arm, BP Patient Position: At rest)   Pulse 62   Temp 36.4 °C (97.6 °F)   Resp 19   Ht 6' (1.829 m)   Wt 71.7 kg (158 lb 1.6 oz)   SpO2 98%   BMI 21.44 kg/m²     Patient is status post Procedure(s):  ESOPHAGOGASTRODUODENOSCOPY (EGD)   :-  COLONOSCOPY  COLON BIOPSY  ENDOSCOPIC POLYPECTOMY  INJECTION. Nausea/Vomiting: Controlled. Postoperative hydration reviewed and adequate. Pain:  Pain Scale 1: Numeric (0 - 10) (05/28/21 1100)  Pain Intensity 1: 0 (05/28/21 1100)   Managed. Neurological Status: At baseline. Mental Status and Level of Consciousness: Arousable. Pulmonary Status:   O2 Device: None (Room air) (05/28/21 1100)   Adequate oxygenation and airway patent. Complications related to anesthesia: None    Post-anesthesia assessment completed. No concerns. I have evaluated the patient and the patient is stable and ready to be discharged from PACU .     Signed By: Deandra Mayo MD    5/28/2021        INITIAL Post-op Vital signs:   Vitals Value Taken Time   /68 05/28/21 1232   Temp 36.4 °C (97.6 °F) 05/28/21 1232   Pulse 62 05/28/21 1232   Resp 19 05/28/21 1232   SpO2 98 % 05/28/21 1232

## 2021-05-29 VITALS
RESPIRATION RATE: 16 BRPM | DIASTOLIC BLOOD PRESSURE: 79 MMHG | TEMPERATURE: 98.2 F | HEIGHT: 72 IN | HEART RATE: 78 BPM | OXYGEN SATURATION: 97 % | SYSTOLIC BLOOD PRESSURE: 126 MMHG | BODY MASS INDEX: 21.36 KG/M2 | WEIGHT: 157.67 LBS

## 2021-05-29 NOTE — PROGRESS NOTES
Transition of Care Plan   RUR- 12% Low Risk   DISPOSITION: The disposition plan is home with family assistance/CM provided patient with hospice list per request.   F/U with PCP/Specialist     Transport: [facility]/family     At 9:50am - CM met with patient at bedside to check in and introduce herself and her role. CM was encouraged to provide patient with a hospice list as patient verbalized leaving AMA. Patient reported, \"I am not signing anything. \" CM informed patient that he did not have to sign anything but rather was provided with a hospice list of local hospice agencies. At 10:15am - Patients friend came to  and reported,\" I am here to pick him up as he called me, I am his friend and he has been talking about suicide for the past few years. \" CM provided patients friend with the hospice list.    Kinsey Minaya

## 2021-05-29 NOTE — PROGRESS NOTES
Patient left AMA with friend, refused to sign AMA paperwork, refused to allow RN to see if he did remove his PIV X 2, stating \"I took them out last night & if you don't believe go through the trash\".

## 2021-05-29 NOTE — PROGRESS NOTES
Patient declined his midnight Albumin, stating that he does not want want any further treatment on him.  He wants to talk to the doctor in the morning about going home with hospice or other alternatives, but no more treatment, no vitals, and no labs on him.    03:32 AM - Patient declined vitals

## 2021-05-29 NOTE — DISCHARGE SUMMARY
Discharge Summary       PATIENT ID: Alicia Finch  MRN: [id number]   YOB: 1945    DATE OF ADMISSION: 5/24/2021  1:15 PM    DATE OF DISCHARGE: 5/29/2021   PRIMARY CARE PROVIDER: None     ATTENDING PHYSICIAN: Caesar Vail MD   DISCHARGING PROVIDER: Caesar Vail MD    To contact this individual call [phone] and ask the  to page. If unavailable ask to be transferred the Adult Hospitalist Department. CONSULTATIONS: IP CONSULT TO HOSPITALIST  IP CONSULT TO HEPATOLOGY  IP CONSULT TO GASTROENTEROLOGY  IP CONSULT TO COLORECTAL SURGERY  IP CONSULT TO COLORECTAL SURGERY    PROCEDURES/SURGERIES: Procedure(s):  ESOPHAGOGASTRODUODENOSCOPY (EGD)   :-  COLONOSCOPY  COLON BIOPSY  ENDOSCOPIC POLYPECTOMY  INJECTION    ADMITTING [address] [place] [address] COURSE:   Pt leaves AMA    Case Management to assist as they are able to arrange or provide contacts with a [address] [facility]. However pt would have to stay to allow this arrangement, pt claims not wanting to wait and will go as soon as his ride comes to pick him up      [place] per notes;     H and P     History of present illness  Alicia Finch is a 76 y.o. male with past medical history of alcohol abuse and remote IV drug abuse presented to the ED from home with chief complaints of leg swelling and abdominal pain. Patient is a limited historian, very anxious and intermittently argumentative during exam.  His friend and power of  who was at the bedside provided some history. Remainder of history was obtained per review of ED and electronic medical records. Per these reports, patient has chronic bilateral leg swelling (since \"2016\") but recently he has generalized swelling not only involving legs but also his scrotum with abdominal distention and pain. Patient would not rate or further describe his abdominal pain. On arrival in the ED, per ED exam patient had 4+ pitting edema of leg and scrotal edema.   Workup included CT abdomen/ pelvis showing a 3.6 cm annular mass in the mid rectum, cirrhosis with portal hypertension, extensive varices including large, and distended gallbladder with no stones or ductal dilatation. Acute hepatitis panel showed Hep C antibody reactive. Patient notes that he thinks that he may have had Hep C in the past with prior remote history of IV drug abuse. Patient is now seen for admission to the hospitalist service. There were no reports of new onset syncope, loss of consciousness, headache, neck pain, visual disturbance, numbness, paresthesias, focal weakness, chest pain, palpitations, SOB, nausea, vomiting, diarrhea, calf pain, increased leg swelling/ edema, fever, chills, rash, or known COVID 19 infection.        A CT noted:   5/24:        1. 3.6 cm annular mass in the mid rectum. There appears to be transmural spread  posterior laterally on the left  2. Cirrhosis with portal hypertension and extensive varices including large  gastric varices  3. Gallbladder is distended but no stones or ductal dilatation.     To confirm suspected cancer colon , GI medine saw, Brittany Elizabeth MD, and  Colon done w biopsy   Colon findings 5/28:     Colon Findings:   Rectal exam: with full insertion of index finger able to feel very distal aspect of mass  Rectum: small hemorrhoids, circumferential friable 50 mm mass in rectum, starting appx 7 cm from anal verge. Mass involves majority of lumen, not able to transverse with colonoscope. Transitioned to ultra-slim scope and able to transverse mass, multiple biopsies obtained and distal aspect tattooed  Sigmoid: Moderate diverticulosis   Descending Colon:  Moderate diverticulosis   Transverse Colon: Mild diverticulosis, one 6 mm sessile polyp, removed with cold snare  Ascending Colon:  Mild diverticulosis, one 6 mm sessile polyp, removed with cold snare  Cecum: normal     Colonoscopy Interventions:  Polypectomy, biopsies, injection            Specimens Removed:    ID Type Source Tests Collected by Time Destination   1 : ascending colon polyp Preservative Colon, Ascending   Theo Henderson MD 5/28/2021 1012 Pathology   2 : transverse colon polyp Preservative Colon, Transverse   Theo Henderson MD 5/28/2021 1014 Pathology   3 : rectal mass bx Preservative Rectum   Theo Henderson MD 5/28/2021 1023 Pathology         Complications: None.      EBL:  Minimal     Impression:    See Postoperative diagnosis above     Recommendations:   - Await pathology. Prep today was adequate to likely identify any lesions > 5 mm. - Colorectal surgery following.        Pt was seen by colorectal surg, Valerie Alves MD,  who noted:     Assessment and Plan: We had a discussion lasting more than 20 minutes during which I gave the patient and his Power of  an overview of rectal cancer staging and how that staging can affect the treatment recommendations. The patient asked appropriate questions, and I attempted to answer them. We did not discuss surgery in detail, but I did tell him, because he asked, that whether or not a colostomy might be needed would depend largely upon the distance of the tumor from the anus and that that would be one of the things that the examination of the colon and rectum in Endoscopy would tell us. I offered to perform a digital rectal examination today, but he did not want me to do it.     He has decided to proceed with the EGD and colonoscopy with Dr. Romel Guy, and he understands that that means he will need to complete a bowel preparation tomorrow.   I will see him again after those procedures have been performed.      Active Problems:    Hyperbilirubinemia (5/24/2021)       Anemia (5/24/2021)       Thrombocytopenia ([address] (5/24/2021)       Rectal mass (5/24/2021)       Generalized weakness (5/24/2021)       Scrotal edema (5/24/2021)       LFT elevation (5/24/2021)       Cirrhosis of liver with ascites ([address] (5/24/2021)       Edema of both lower extremities (5/24/2021)             Pt choses to leave [address] [place] [address] / PLAN:      1.  colo rectal cancer stages 2 + TBD  2. AMA discharge    Else:     · No new issues  · Taran Self as below         Assessment & Plan:      Cirrhosis of liver with ascites in the setting of alcohol abuse and remote IV drug use. Generalized abdominal pain  Hyperbilirubinemia/transaminitis  Thrombocytopenia  Cirrhosis with portal hypertension and extensive varices including large  gastric varices seen on CT a/p  hepc C ab +  Cont' IV lasix  Monitor LFT  Cont' CIWA, MVI, folic acid, thiamine  Hepatologist/GI consulted- see note           Rectal mass  CT a/p showed 3.6 cm annular mass in the mid rectum. Colorectal surgeon's note is reviewed. Pt states he was un decisive yesterday but is willing to undergo further workup to include biopsy, surgery if that is offered. Will re-consult  GI also consulted, I spoke to Joel  98.. - colon in am 5/28     Anemia   Iron profile suggestive of chronic disease. B12 wnl, and folate levels  Daily labs, transfuse for hgb<7  FOBT neg   - f/u iron profile      Generalized weakness   Required assistance to stand; place on fall precautions  Refused PT/OT, will need to re-consult if pt in agreement.     Pulmonary edema  Anasarca   Former used diuretics but patient stopped due to \"diarrhea\". CXR showed interstitial pulmonary edema or chronic interstitial lung disease more likely  than interstitial pneumonitis given the clinical history. Nonspecific sclerosis in the posterior right fourth rib.  Consider non emergent bone scan  Probnp 744  B/l duplex US neg for DVT, Echo showed nl EF   Cont' IV lasix BID  Keep legs elevated at rest, daily wt, strict I&O  Monitor fluid status with strict Is and Os / daily weights  - no s/s of pul edema 5/27/2021 - 5/29/2021          Hypokalemia, K 2.9, pt refusing PO KCl, will replete with IV KCl, check mag  - 3.0 5/27/2021 for iv replacement 5/27/2021 , mag in am   Magnesium Date Value Ref Range Status   05/28/2021 1.8 1.6 - 2.4 mg/dL Final   05/27/2021 1.8 1.6 - 2.4 mg/dL Final   05/25/2021 1.9 1.6 - 2.4 mg/dL Final     Lab Results   Component Value Date/Time    Potassium 3.2 (L) 05/28/2021 12:27 AM         Tobacco abuse  Encourage smoking cessation  Nicotine patch     VTE prophylaxis:  SCDs to [address] [place]:  DO NOT RESUSCITATE (DNR) as discussed with both patient and his power of . Kailash Gage signed durable DNR form. ADDITIONAL CARE RECOMMENDATIONS:   Na     PENDING TEST RESULTS:   At the time of discharge the following test results are still pending: na    FOLLOW UP APPOINTMENTS:    Follow-up Information     Follow up With Specialties Details Why Contact Info    None    None (395) Patient stated that they have no PCP               DIET: Resume previous diet     ACTIVITY: Activity as tolerated    WOUND CARE: na    EQUIPMENT needed: na      DISCHARGE MEDICATIONS:  There are no discharge medications for this patient. NOTIFY YOUR PHYSICIAN FOR ANY OF THE FOLLOWING:   Fever over 101 degrees for 24 hours. Chest pain, shortness of breath, fever, chills, nausea, vomiting, diarrhea, change in mentation, falling, weakness, bleeding. Severe pain or pain not relieved by medications. Or, any other signs or symptoms that you may have questions about.     DISPOSITION:    Home With:   OT  PT  HH  RN       Long term SNF/Inpatient Rehab   x Independent/assisted living    Hospice    Other:       PATIENT CONDITION AT DISCHARGE:     Functional status    Poor     Deconditioned    x Independent      Cognition   x  Lucid     Forgetful     Dementia      Catheters/lines (plus indication)    Huggins     PICC     PEG    x None      Code status     Full code    x DNR      PHYSICAL EXAMINATION AT DISCHARGE:  AMA discharge       CHRONIC MEDICAL DIAGNOSES:  Problem List as of 5/29/2021 Date Reviewed: 5/28/2021        Codes Class Noted - Resolved Hyperbilirubinemia ICD-10-CM: E80.6  ICD-9-CM: 782.4  5/24/2021 - Present        Anemia ICD-10-CM: D64.9  ICD-9-CM: 285.9  5/24/2021 - Present        Thrombocytopenia ([address] ICD-10-CM: D69.6  ICD-9-CM: 287.5  5/24/2021 - Present        Rectal mass ICD-10-CM: K62.89  ICD-9-CM: 787.99  5/24/2021 - Present        Generalized weakness ICD-10-CM: R53.1  ICD-9-CM: 780.79  5/24/2021 - Present        Scrotal edema ICD-10-CM: N50.89  ICD-9-CM: 608.86  5/24/2021 - Present        LFT elevation ICD-10-CM: R79.89  ICD-9-CM: 790.6  5/24/2021 - Present        Cirrhosis of liver with ascites [facility]) ICD-10-CM: K74.60, R18.8  ICD-9-CM: 571.5  5/24/2021 - Present        Edema of both lower extremities ICD-10-CM: R60.0  ICD-9-CM: 782.3  5/24/2021 - Present              Greater than 20  minutes were spent with the patient on counseling and coordination of care    Signed:   Lolly Andre MD  5/29/2021  9:02 AM

## 2021-06-12 NOTE — PROGRESS NOTES
Physician Progress Note      PATIENT:               Chasity Montoya  CSN #:                  [id number]  :                       1945  ADMIT DATE:       2021 1:15 PM  [address] [place] DATE:        2021 2:11 PM  RESPONDING  PROVIDER #:        Amy Castro MD          QUERY TEXT:    Patient presented with BLE edema and abdominal pain and was diagnosed with anasarca. CT AP noted? cirrhosis with portal hypertension and extensive varices including large? gastric varices. Patient was noted to have a history of ETOH intake. ? Acute hepatitis panel showed Hep C antibody reactive. Patient was seen by hepatology who noted, \"etiology of cirrhosis is presumed due to alcohol and chronic HCV. \" The DC Summary notes, \"Cirrhosis of liver with ascites in the setting of alcohol abuse and remote IV drug use. \" Patient was evaluated by GI and hepatology. Patient was treated with Lasix IV, albumin IV, and I&O monitoring. After study, can the etiology of patient's anasarca be further specified as: Thank you,  Dulce Garza  [phone]  Options provided:  -- Alcoholic cirrhosis  -- Portal hypertension  -- Both alcoholic cirrhosis and portal hypertension  -- Other - I will add my own diagnosis  -- Disagree - Not applicable / Not valid  -- Disagree - Clinically unable to determine / Unknown  -- Refer to Clinical Documentation Reviewer    PROVIDER RESPONSE TEXT:    Etiology of patient's anasarca is alcoholic cirrhosis    Query created by: Benito Thomas on 2021 9:05 AM      QUERY TEXT:    Patient admitted with BLE edema, CXR showed? interstitial pulmonary edema or chronic interstitial lung disease more likely? than interstitial pneumonitis given the clinical history. . If possible, please document in progress notes and discharge summary if you are evaluating and/or treating any of the following:     The medical record reflects the following:  Risk Factors: 74yo admitted for BLE edema  Clinical Indicators: CXR showed? interstitial pulmonary edema or chronic interstitial lung disease more likely? than interstitial pneumonitis given the clinical history  Treatment: Lasix IV was continued, chest x-ray    Thank you,  Luis Alberto Coulter  [phone]  Options provided:  -- Acute pulmonary edema  -- Pulmonary edema not clinically significant  -- Other - I will add my own diagnosis  -- Disagree - Not applicable / Not valid  -- Disagree - Clinically unable to determine / Unknown  -- Refer to Clinical Documentation Reviewer    PROVIDER RESPONSE TEXT:    This patient has acute pulmonary edema.     Query created by: Bruna Judd on 6/11/2021 9:05 AM      Electronically signed by:  Daina Butler MD 6/12/2021 9:53 AM

## (undated) DEVICE — Device: Brand: SINGLE USE INJECTOR NM600/610

## (undated) DEVICE — STAIN INDIA INK IN NACL 10ML --

## (undated) DEVICE — FORCEPS BX L240CM JAW DIA2.8MM L CAP W/ NDL MIC MESH TOOTH

## (undated) DEVICE — SNARE ENDOSCP L240CM LOOP W27MM SHTH DIA2.4MM WRK CHN 2.8MM

## (undated) DEVICE — TRAP SURG QUAD PARABOLA SLOT DSGN SFTY SCRN TRAPEASE

## (undated) DEVICE — TUBING HYDR IRR --